# Patient Record
Sex: FEMALE | Race: WHITE | URBAN - METROPOLITAN AREA
[De-identification: names, ages, dates, MRNs, and addresses within clinical notes are randomized per-mention and may not be internally consistent; named-entity substitution may affect disease eponyms.]

---

## 2023-12-20 ENCOUNTER — HOSPITAL ENCOUNTER (EMERGENCY)
Facility: HOSPITAL | Age: 63
Discharge: HOME | End: 2023-12-20
Payer: COMMERCIAL

## 2023-12-20 ENCOUNTER — APPOINTMENT (OUTPATIENT)
Dept: RADIOLOGY | Facility: HOSPITAL | Age: 63
End: 2023-12-20
Payer: COMMERCIAL

## 2023-12-20 VITALS
WEIGHT: 270 LBS | RESPIRATION RATE: 18 BRPM | TEMPERATURE: 97.5 F | DIASTOLIC BLOOD PRESSURE: 65 MMHG | HEIGHT: 63 IN | SYSTOLIC BLOOD PRESSURE: 136 MMHG | HEART RATE: 66 BPM | BODY MASS INDEX: 47.84 KG/M2 | OXYGEN SATURATION: 98 %

## 2023-12-20 DIAGNOSIS — S92.325A CLOSED NONDISPLACED FRACTURE OF SECOND METATARSAL BONE OF LEFT FOOT, INITIAL ENCOUNTER: Primary | ICD-10-CM

## 2023-12-20 DIAGNOSIS — S93.325A DISLOCATION OF TARSOMETATARSAL JOINT OF LEFT FOOT, INITIAL ENCOUNTER: ICD-10-CM

## 2023-12-20 PROCEDURE — 2500000001 HC RX 250 WO HCPCS SELF ADMINISTERED DRUGS (ALT 637 FOR MEDICARE OP): Performed by: NURSE PRACTITIONER

## 2023-12-20 PROCEDURE — 73610 X-RAY EXAM OF ANKLE: CPT | Mod: LEFT SIDE | Performed by: RADIOLOGY

## 2023-12-20 PROCEDURE — 73630 X-RAY EXAM OF FOOT: CPT | Mod: LT

## 2023-12-20 PROCEDURE — 29515 APPLICATION SHORT LEG SPLINT: CPT | Mod: LT

## 2023-12-20 PROCEDURE — 73610 X-RAY EXAM OF ANKLE: CPT | Mod: LT

## 2023-12-20 PROCEDURE — 99284 EMERGENCY DEPT VISIT MOD MDM: CPT

## 2023-12-20 RX ORDER — OXYCODONE AND ACETAMINOPHEN 5; 325 MG/1; MG/1
1 TABLET ORAL 4 TIMES DAILY PRN
Qty: 10 TABLET | Refills: 0 | Status: SHIPPED | OUTPATIENT
Start: 2023-12-20

## 2023-12-20 RX ORDER — IBUPROFEN 600 MG/1
600 TABLET ORAL ONCE
Status: COMPLETED | OUTPATIENT
Start: 2023-12-20 | End: 2023-12-20

## 2023-12-20 RX ADMIN — IBUPROFEN 600 MG: 600 TABLET, FILM COATED ORAL at 18:52

## 2023-12-20 ASSESSMENT — COLUMBIA-SUICIDE SEVERITY RATING SCALE - C-SSRS
1. IN THE PAST MONTH, HAVE YOU WISHED YOU WERE DEAD OR WISHED YOU COULD GO TO SLEEP AND NOT WAKE UP?: NO
2. HAVE YOU ACTUALLY HAD ANY THOUGHTS OF KILLING YOURSELF?: NO
6. HAVE YOU EVER DONE ANYTHING, STARTED TO DO ANYTHING, OR PREPARED TO DO ANYTHING TO END YOUR LIFE?: NO

## 2023-12-20 ASSESSMENT — PAIN - FUNCTIONAL ASSESSMENT
PAIN_FUNCTIONAL_ASSESSMENT: 0-10
PAIN_FUNCTIONAL_ASSESSMENT: WONG-BAKER FACES

## 2023-12-20 ASSESSMENT — PAIN DESCRIPTION - LOCATION
LOCATION: FOOT
LOCATION: FOOT

## 2023-12-20 ASSESSMENT — PAIN SCALES - GENERAL: PAINLEVEL_OUTOF10: 5 - MODERATE PAIN

## 2023-12-20 ASSESSMENT — PAIN SCALES - WONG BAKER: WONGBAKER_NUMERICALRESPONSE: HURTS LITTLE BIT

## 2023-12-20 ASSESSMENT — PAIN DESCRIPTION - ORIENTATION: ORIENTATION: LEFT

## 2023-12-20 NOTE — ED PROVIDER NOTES
HPI   Chief Complaint   Patient presents with    Ankle Pain       Patient is a healthy nontoxic-appearing 63-year-old female with past medical history of otalgia, TMJ dysfunction, presents to the emergency room for complaint of left ankle and foot pain.  Patient states she accidentally slipped on the ice just prior to arrival.  Patient states she rolled her ankle.  Patient states she called EMS who brought her to the hospital.  Patient denies hitting her head or any loss of consciousness.  Patient denies any neck pain.  Patient denies any sore throat chest pain shortness of breath or difficulty breathing, abdominal pain, nausea, vomiting, diarrhea or constipation.  Patient denies any fever, shaking, chills.  Patient denies any blood thinner use, numbness or tingling, headache pain, visual disturbances, numbness and tingling.  Patient states he was unable to walk after injuring her leg.                          Holly Coma Scale Score: 15                  Patient History   History reviewed. No pertinent past medical history.  History reviewed. No pertinent surgical history.  No family history on file.  Social History     Tobacco Use    Smoking status: Not on file    Smokeless tobacco: Not on file   Substance Use Topics    Alcohol use: Not on file    Drug use: Not on file       Physical Exam   ED Triage Vitals [12/20/23 1800]   Temp Heart Rate Resp BP   36.4 °C (97.5 °F) 69 20 132/62      SpO2 Temp Source Heart Rate Source Patient Position   98 % Temporal Brachial Sitting      BP Location FiO2 (%)     Left arm --       Physical Exam  Vitals and nursing note reviewed.   Constitutional:       General: She is not in acute distress.     Appearance: Normal appearance. She is not ill-appearing, toxic-appearing or diaphoretic.   HENT:      Head: Normocephalic.   Eyes:      General:         Right eye: No discharge.         Left eye: No discharge.      Extraocular Movements: Extraocular movements intact.      Pupils: Pupils  are equal, round, and reactive to light.   Cardiovascular:      Rate and Rhythm: Normal rate and regular rhythm.      Pulses: Normal pulses.      Heart sounds: Normal heart sounds. No murmur heard.     No friction rub. No gallop.   Pulmonary:      Effort: Pulmonary effort is normal. No respiratory distress.      Breath sounds: Normal breath sounds. No stridor. No wheezing, rhonchi or rales.   Chest:      Chest wall: No tenderness.   Musculoskeletal:         General: Swelling, tenderness and signs of injury present. No deformity. Normal range of motion.      Cervical back: Normal range of motion and neck supple.      Right lower leg: No edema.      Left lower leg: No edema.      Comments: Left ankle is in a vacuum splint on initial evaluation, DP pulses are strong and intact, cap refill less than 2 seconds, able to flex extend all digits without any difficulty, cap refill less than 3 seconds   Skin:     General: Skin is warm.      Capillary Refill: Capillary refill takes less than 2 seconds.      Coloration: Skin is not jaundiced or pale.      Findings: No bruising, erythema, lesion or rash.   Neurological:      General: No focal deficit present.      Mental Status: She is alert and oriented to person, place, and time.         ED Course & MDM   ED Course as of 12/20/23 1937   Wed Dec 20, 2023   1937 X-ray of the foot reveals  IMPRESSION:  Nondisplaced fracture through the base of the 2nd metatarsal. No  subluxation seen however findings are compatible with underlying  Lisfranc injury. Cross-sectional imaging such as CT can be performed  for further evaluation   [EC]   1937 X-ray of the ankle reveals  IMPRESSION:  No acute abnormality in the ankle. There is lateral malleolar soft  tissue edema.   [EC]      ED Course User Index  [EC] Thai Pressley, APRN-CNP         Diagnoses as of 12/20/23 1937   Closed nondisplaced fracture of second metatarsal bone of left foot, initial encounter   Dislocation of tarsometatarsal  joint of left foot, initial encounter       Medical Decision Making  Given patient's complaint and presentation a thorough exam was performed.  Patient does have reproducible tenderness upon palpation to the anterior ankle in a vacuum splint as well as reproducible tenderness upon palpation to the dorsal surface of the left foot.  DP pulses are strong and regular, vacuum splint obstructing PT pulse evaluation initially, patient is able to flex extend all digits without any difficulty, cap refill less than 2 seconds, remains hemodynamically stable during emergency room evaluation.  X-ray ordered of the left foot and left ankle.  X-rays of the left foot and ankle interpreted by radiologist revealed nondisplaced fracture through the base of the second metatarsal, no subluxation seen however findings are compatible with underlying Lisfranc injury.  I consulted Dr. Mcgarry in regards to x-ray findings.  Dr. Mcgarry recommends posterior short leg splint, crutches, nonweightbearing status and follow-up in the office within the next 1 to 2 days.  Recommendations were then discussed with patient.  Patient was placed in a posterior short leg splint and tolerated procedure well.  Patient remained neurovascularly intact after splint application.  Patient received crutches in the emergency room as well as a prescription for Percocet.  Patient received ibuprofen in the emergency room for discomfort.  I encouraged monitoring symptoms and if they become worse was given strict precautions return the emergency room immediately, otherwise follow-up per Dr. Mcgarry recommendations.  The patient was agreeable this plan discharged home in stable condition.    JUAN ANTONIO Hernandez     Portions of this note were generated using digital voice recognition software, and may contain grammatical errors      Procedure  Procedures       JUAN ANTONIO Hernandez  12/20/23 1939

## 2023-12-21 NOTE — DISCHARGE INSTRUCTIONS
"What is a fracture?  A \"fracture\" is another word for a broken bone. There are different kinds of fractures, depending on how the bone breaks. When a bone breaks, it might crack, break all the way through, or shatter. If a broken bone sticks out of the skin or can be seen through a wound, doctors call it an \"open\" fracture. If the bone does not stick out of the skin or cannot be seen through a wound, doctors call it a \"closed\" fracture.    People with a condition called osteoporosis have a higher chance of getting a fracture. That's because osteoporosis makes a person's bones weak. This condition is especially common in older females.    What are the symptoms of a fracture?  Symptoms depend on which bone breaks and the kind of break it is. Common symptoms can include:    ?Pain, swelling, or bruising over the area    ?The area looking abnormal, bent, or not the usual shape    ?Not being able to move or put weight on that part of the body    ?Numbness in the area of the broken bone    If a fracture injures a nerve, this can also cause symptoms in nearby areas. For example, a break to the upper arm bone might cause pain, tingling, or weakness in the elbow and wrist.    Is there a test for a fracture?  Yes. Your doctor or nurse will ask about your symptoms, do an exam, and take an X-ray. They might do other imaging tests, such as a CT, bone scan, or ultrasound. Imaging tests create pictures of the inside of the body.    How are fractures treated?  Treatment depends, in part, on the type of fracture you have and how serious it is. The goal of treatment is to have the ends of the broken bone line up with each other so that the bone can heal.    If the ends of your broken bone are already in line with each other, your doctor will put a cast, splint, or brace on that part of the body. The cast, splint, or brace will keep your bone in the correct position so that it can heal.    If the ends of your broken bone are not in " line with each other, your doctor will need to line them up. To do this, they can move your bone to the correct position without doing surgery, and then put a cast, splint, or brace on.    They can also do surgery to put your bone back in the correct position. This can involve:    ?Using screws, pins, rods, or plates to fix a bone inside the body    ?Putting pins or screws through the skin and into the bone, and then attaching the pins or screws to a bar that is outside the skin    Your doctor will also treat your pain. If you have a severe fracture, they can prescribe a strong pain medicine. If you have a mild fracture, they might recommend that you take an over-the-counter medicine for your pain. Over-the-counter medicines include acetaminophen (sample brand name: Tylenol), ibuprofen (sample brand names: Advil, Motrin), and naproxen (sample brand name: Aleve).    After your bone heals, your doctor might recommend that you work with a physical therapist (exercise expert). The physical therapist can show you exercises and stretches to strengthen your muscles and help your joints move more easily.    How long do fractures take to heal?  It depends on the body part involved and the type of fracture. Most fractures take weeks to months to heal. Fractures in children usually heal faster than fractures in adults.    Can I do anything to improve the healing process?  Yes. It's important to follow all of your doctor's instructions while your fracture is healing. For example, they will probably recommend that you eat a healthy diet that includes getting enough calcium, protein, and vitamin D (figure 1 and table 1). They will also probably recommend that you:    ?Not play certain sports    ?Not smoke - If you smoke, it can take longer for your fracture to heal.    ?Not get your cast wet, if you have a cast that shouldn't get wet    When should I call my doctor or nurse?  After treatment, your doctor or nurse will tell you  when to call them. In general, you should call if:    ?Your pain, swelling, or other symptoms get worse    ?You get a fever    ?You can't move part of your body    ?You get your cast wet, and it's not supposed to get wet    What can I do to prevent getting another fracture?  To protect your body from getting hurt, you can:    ?Wear safety gear when you bike, ski, rollerblade, or do activities where you could get hurt. Safety gear can include helmets, elbow pads, knee pads, wrist guards, and shin pads.    ?Keep walkways clear of clutter, and remove or tack down loose rugs    ?Wear a seatbelt every time you ride or drive in a car    ?Put a non-slip mat in the bathtub and handrails on the stairs (especially for older people)    To help your bones stay strong so that they don't break easily, you can:    ?Eat and drink foods with a lot of calcium, vitamin D, and protein. Calcium and vitamin D are nutrients that help keep bones strong.    ?Get regular exercise, even if it's just taking a daily walk

## 2024-04-15 ENCOUNTER — OFFICE VISIT (OUTPATIENT)
Dept: ORTHOPEDIC SURGERY | Facility: CLINIC | Age: 64
End: 2024-04-15
Payer: COMMERCIAL

## 2024-04-15 VITALS — HEIGHT: 62 IN | BODY MASS INDEX: 49.13 KG/M2 | WEIGHT: 267 LBS

## 2024-04-15 DIAGNOSIS — M77.11 RIGHT LATERAL EPICONDYLITIS: Primary | ICD-10-CM

## 2024-04-15 PROCEDURE — 1036F TOBACCO NON-USER: CPT | Performed by: ORTHOPAEDIC SURGERY

## 2024-04-15 PROCEDURE — 99203 OFFICE O/P NEW LOW 30 MIN: CPT | Performed by: ORTHOPAEDIC SURGERY

## 2024-04-15 RX ORDER — VALBENAZINE 80 MG/1
CAPSULE ORAL
COMMUNITY
Start: 2023-12-14

## 2024-04-15 RX ORDER — LEVOTHYROXINE SODIUM 75 UG/1
75 TABLET ORAL DAILY
COMMUNITY
Start: 2024-03-21

## 2024-04-15 RX ORDER — IBUPROFEN 800 MG/1
800 TABLET ORAL 3 TIMES DAILY
Qty: 90 TABLET | Refills: 0 | Status: SHIPPED | OUTPATIENT
Start: 2024-04-15 | End: 2024-05-15

## 2024-04-15 RX ORDER — HYDROGEN PEROXIDE 3 %
1 SOLUTION, NON-ORAL MISCELLANEOUS
COMMUNITY
Start: 2023-12-15

## 2024-04-15 RX ORDER — LUMATEPERONE 42 MG/1
42 CAPSULE ORAL
COMMUNITY
Start: 2023-12-15

## 2024-04-15 RX ORDER — LISINOPRIL 10 MG/1
1 TABLET ORAL
COMMUNITY
Start: 2023-12-15

## 2024-04-15 RX ORDER — PRAVASTATIN SODIUM 20 MG/1
1 TABLET ORAL
COMMUNITY
Start: 2023-12-15

## 2024-04-15 NOTE — PROGRESS NOTES
Subjective    Patient ID: Yisel Squires is a 63 y.o. female.    Chief Complaint: Pain of the Right Elbow     Last Surgery: No surgery found  Last Surgery Date: No surgery found    HPI  Is a 63-year-old right-hand-dominant female who comes in with a complaint of right elbow pain.  This is been present since she fell on right upper extremity on the ice on December 20, 2023.  She has had multiple imaging studies done.  She states that her most recent imaging was an MRI.  She comes in after this was found to have showing injury to the extensor tendons.  Most of her pain is near the joint itself.  It is mostly lateral as opposed to medial.  She denies numbness and paresthesias.  Objective   Ortho Exam  Patient is in no acute distress.  Exam of the patient's right upper extremity reveals she has pain-free motion at her shoulder and wrist.  She has range of motion at her right elbow from 0 to greater than 130 degrees of flexion.  She has full supination and pronation.  She is tender over the lateral epicondyle.  She has no tenderness medially.  She has increased pain with resisted wrist extension.    Image Results:  The patient has an MRI which was reviewed.  It does show evidence of lateral epicondylitis as there is tears in the common extensor tendons.    Assessment/Plan   Encounter Diagnoses:  Right lateral epicondylitis    Orders Placed This Encounter    ibuprofen 800 mg tablet     Patient has evidence of atraumatic right lateral epicondylitis.  At this time the patient will undergo therapy.  The patient was also prescribed ibuprofen.  The patient will follow-up as her symptoms dictate.

## 2024-10-21 ENCOUNTER — OFFICE VISIT (OUTPATIENT)
Dept: ORTHOPEDIC SURGERY | Facility: CLINIC | Age: 64
End: 2024-10-21
Payer: COMMERCIAL

## 2024-10-21 DIAGNOSIS — M77.11 RIGHT LATERAL EPICONDYLITIS: ICD-10-CM

## 2024-10-21 DIAGNOSIS — M25.521 RIGHT ELBOW PAIN: Primary | ICD-10-CM

## 2024-10-21 PROCEDURE — 99213 OFFICE O/P EST LOW 20 MIN: CPT | Performed by: ORTHOPAEDIC SURGERY

## 2024-10-21 PROCEDURE — 99213 OFFICE O/P EST LOW 20 MIN: CPT | Mod: 25 | Performed by: ORTHOPAEDIC SURGERY

## 2024-10-21 PROCEDURE — 1036F TOBACCO NON-USER: CPT | Performed by: ORTHOPAEDIC SURGERY

## 2024-10-21 PROCEDURE — 20551 NJX 1 TENDON ORIGIN/INSJ: CPT | Mod: RT | Performed by: ORTHOPAEDIC SURGERY

## 2024-10-21 PROCEDURE — 2500000004 HC RX 250 GENERAL PHARMACY W/ HCPCS (ALT 636 FOR OP/ED): Performed by: ORTHOPAEDIC SURGERY

## 2024-10-21 RX ORDER — LIDOCAINE HYDROCHLORIDE 20 MG/ML
1 INJECTION, SOLUTION INFILTRATION; PERINEURAL
Status: COMPLETED | OUTPATIENT
Start: 2024-10-21 | End: 2024-10-21

## 2024-10-21 RX ORDER — IBUPROFEN 800 MG/1
800 TABLET ORAL EVERY 8 HOURS PRN
Qty: 90 TABLET | Refills: 3 | Status: SHIPPED | OUTPATIENT
Start: 2024-10-21 | End: 2024-11-20

## 2024-10-21 RX ORDER — TRIAMCINOLONE ACETONIDE 40 MG/ML
40 INJECTION, SUSPENSION INTRA-ARTICULAR; INTRAMUSCULAR
Status: COMPLETED | OUTPATIENT
Start: 2024-10-21 | End: 2024-10-21

## 2024-10-21 NOTE — PROGRESS NOTES
Subjective    Patient ID: Yisel Squires is a 64 y.o. female.    Chief Complaint: Follow-up of the Right Elbow     Last Surgery: No surgery found  Last Surgery Date: No surgery found    HPI  The patient returns today for follow-up of her right lateral epicondylitis.  Since her last visit she noticed mild improvement with ibuprofen and stretching.  Today she would like to discuss further treatment possibilities.    Objective   Ortho Exam  The patient is in no acute distress.  Exam of her right upper extremity reveals that she has pain-free motion at her shoulder and elbow.  She is tender over the right lateral epicondyle.  She has no tenderness medially.  She does have increased pain with resisted wrist and finger extension.    Assessment/Plan   Encounter Diagnoses:  Right elbow pain    Right lateral epicondylitis    Orders Placed This Encounter    ibuprofen 800 mg tablet     The patient has continued pain from her right lower epicondylitis.  She will continue with her therapy program.  However she would like to undergo a Kenalog injection today.    Hand / UE Inj/Asp: R elbow for lateral epicondylitis on 10/21/2024 2:07 PM  Indications: pain  Details: 25 G needle, lateral approach  Medications: 40 mg triamcinolone acetonide 40 mg/mL; 1 mL lidocaine 20 mg/mL (2 %)  Outcome: tolerated well, no immediate complications  Procedure, treatment alternatives, risks and benefits explained, specific risks discussed. Consent was given by the patient. Immediately prior to procedure a time out was called to verify the correct patient, procedure, equipment, support staff and site/side marked as required. Patient was prepped and draped in the usual sterile fashion.         The patient was informed it would take approximately a week for the injection of an effect.  She will continue with her program of exercises.  She will follow-up as her symptoms dictate.

## 2025-04-26 ENCOUNTER — APPOINTMENT (OUTPATIENT)
Dept: RADIOLOGY | Facility: HOSPITAL | Age: 65
End: 2025-04-26
Payer: COMMERCIAL

## 2025-04-26 ENCOUNTER — APPOINTMENT (OUTPATIENT)
Dept: CARDIOLOGY | Facility: HOSPITAL | Age: 65
End: 2025-04-26
Payer: COMMERCIAL

## 2025-04-26 ENCOUNTER — HOSPITAL ENCOUNTER (INPATIENT)
Facility: HOSPITAL | Age: 65
End: 2025-04-26
Attending: STUDENT IN AN ORGANIZED HEALTH CARE EDUCATION/TRAINING PROGRAM | Admitting: STUDENT IN AN ORGANIZED HEALTH CARE EDUCATION/TRAINING PROGRAM
Payer: COMMERCIAL

## 2025-04-26 DIAGNOSIS — K85.90 ACUTE PANCREATITIS, UNSPECIFIED COMPLICATION STATUS, UNSPECIFIED PANCREATITIS TYPE (HHS-HCC): Primary | ICD-10-CM

## 2025-04-26 LAB
ALBUMIN SERPL BCP-MCNC: 4.3 G/DL (ref 3.4–5)
ALP SERPL-CCNC: 84 U/L (ref 33–136)
ALT SERPL W P-5'-P-CCNC: 14 U/L (ref 7–45)
ANION GAP SERPL CALC-SCNC: 15 MMOL/L (ref 10–20)
APPEARANCE UR: CLEAR
AST SERPL W P-5'-P-CCNC: 16 U/L (ref 9–39)
BASOPHILS # BLD AUTO: 0.04 X10*3/UL (ref 0–0.1)
BASOPHILS NFR BLD AUTO: 0.3 %
BILIRUB SERPL-MCNC: 0.7 MG/DL (ref 0–1.2)
BILIRUB UR STRIP.AUTO-MCNC: NEGATIVE MG/DL
BUN SERPL-MCNC: 10 MG/DL (ref 6–23)
CALCIUM SERPL-MCNC: 9.1 MG/DL (ref 8.6–10.3)
CHLORIDE SERPL-SCNC: 102 MMOL/L (ref 98–107)
CHOLEST SERPL-MCNC: 160 MG/DL (ref 0–199)
CHOLESTEROL/HDL RATIO: 2
CO2 SERPL-SCNC: 24 MMOL/L (ref 21–32)
COLOR UR: COLORLESS
CREAT SERPL-MCNC: 0.85 MG/DL (ref 0.5–1.05)
EGFRCR SERPLBLD CKD-EPI 2021: 78 ML/MIN/1.73M*2
EOSINOPHIL # BLD AUTO: 0.03 X10*3/UL (ref 0–0.7)
EOSINOPHIL NFR BLD AUTO: 0.2 %
ERYTHROCYTE [DISTWIDTH] IN BLOOD BY AUTOMATED COUNT: 14.3 % (ref 11.5–14.5)
EST. AVERAGE GLUCOSE BLD GHB EST-MCNC: 120 MG/DL
GLUCOSE SERPL-MCNC: 111 MG/DL (ref 74–99)
GLUCOSE UR STRIP.AUTO-MCNC: NORMAL MG/DL
HBA1C MFR BLD: 5.8 % (ref ?–5.7)
HCT VFR BLD AUTO: 40.9 % (ref 36–46)
HDLC SERPL-MCNC: 80.3 MG/DL
HGB BLD-MCNC: 13.2 G/DL (ref 12–16)
HOLD SPECIMEN: NORMAL
IMM GRANULOCYTES # BLD AUTO: 0.04 X10*3/UL (ref 0–0.7)
IMM GRANULOCYTES NFR BLD AUTO: 0.3 % (ref 0–0.9)
KETONES UR STRIP.AUTO-MCNC: NEGATIVE MG/DL
LDLC SERPL CALC-MCNC: 52 MG/DL
LEUKOCYTE ESTERASE UR QL STRIP.AUTO: NEGATIVE
LIPASE SERPL-CCNC: 1822 U/L (ref 9–82)
LYMPHOCYTES # BLD AUTO: 1.32 X10*3/UL (ref 1.2–4.8)
LYMPHOCYTES NFR BLD AUTO: 10.6 %
MAGNESIUM SERPL-MCNC: 1.84 MG/DL (ref 1.6–2.4)
MCH RBC QN AUTO: 30.6 PG (ref 26–34)
MCHC RBC AUTO-ENTMCNC: 32.3 G/DL (ref 32–36)
MCV RBC AUTO: 95 FL (ref 80–100)
MONOCYTES # BLD AUTO: 0.74 X10*3/UL (ref 0.1–1)
MONOCYTES NFR BLD AUTO: 5.9 %
NEUTROPHILS # BLD AUTO: 10.32 X10*3/UL (ref 1.2–7.7)
NEUTROPHILS NFR BLD AUTO: 82.7 %
NITRITE UR QL STRIP.AUTO: NEGATIVE
NON HDL CHOLESTEROL: 80 MG/DL (ref 0–149)
NRBC BLD-RTO: 0 /100 WBCS (ref 0–0)
PH UR STRIP.AUTO: 6 [PH]
PLATELET # BLD AUTO: 322 X10*3/UL (ref 150–450)
POTASSIUM SERPL-SCNC: 4.1 MMOL/L (ref 3.5–5.3)
PROT SERPL-MCNC: 6.6 G/DL (ref 6.4–8.2)
PROT UR STRIP.AUTO-MCNC: NEGATIVE MG/DL
RBC # BLD AUTO: 4.32 X10*6/UL (ref 4–5.2)
RBC # UR STRIP.AUTO: NEGATIVE MG/DL
SODIUM SERPL-SCNC: 137 MMOL/L (ref 136–145)
SP GR UR STRIP.AUTO: 1.02
TRIGL SERPL-MCNC: 140 MG/DL (ref 0–149)
UROBILINOGEN UR STRIP.AUTO-MCNC: NORMAL MG/DL
VLDL: 28 MG/DL (ref 0–40)
WBC # BLD AUTO: 12.5 X10*3/UL (ref 4.4–11.3)

## 2025-04-26 PROCEDURE — 2500000004 HC RX 250 GENERAL PHARMACY W/ HCPCS (ALT 636 FOR OP/ED): Mod: JZ

## 2025-04-26 PROCEDURE — 99285 EMERGENCY DEPT VISIT HI MDM: CPT | Mod: 25 | Performed by: STUDENT IN AN ORGANIZED HEALTH CARE EDUCATION/TRAINING PROGRAM

## 2025-04-26 PROCEDURE — 80053 COMPREHEN METABOLIC PANEL: CPT | Performed by: STUDENT IN AN ORGANIZED HEALTH CARE EDUCATION/TRAINING PROGRAM

## 2025-04-26 PROCEDURE — 74183 MRI ABD W/O CNTR FLWD CNTR: CPT

## 2025-04-26 PROCEDURE — 2550000001 HC RX 255 CONTRASTS: Mod: JZ

## 2025-04-26 PROCEDURE — 96374 THER/PROPH/DIAG INJ IV PUSH: CPT

## 2025-04-26 PROCEDURE — 93005 ELECTROCARDIOGRAM TRACING: CPT

## 2025-04-26 PROCEDURE — 36415 COLL VENOUS BLD VENIPUNCTURE: CPT | Performed by: STUDENT IN AN ORGANIZED HEALTH CARE EDUCATION/TRAINING PROGRAM

## 2025-04-26 PROCEDURE — 2500000004 HC RX 250 GENERAL PHARMACY W/ HCPCS (ALT 636 FOR OP/ED): Mod: JZ | Performed by: STUDENT IN AN ORGANIZED HEALTH CARE EDUCATION/TRAINING PROGRAM

## 2025-04-26 PROCEDURE — 96361 HYDRATE IV INFUSION ADD-ON: CPT

## 2025-04-26 PROCEDURE — 85025 COMPLETE CBC W/AUTO DIFF WBC: CPT | Performed by: STUDENT IN AN ORGANIZED HEALTH CARE EDUCATION/TRAINING PROGRAM

## 2025-04-26 PROCEDURE — 74177 CT ABD & PELVIS W/CONTRAST: CPT | Performed by: RADIOLOGY

## 2025-04-26 PROCEDURE — 2550000001 HC RX 255 CONTRASTS: Performed by: STUDENT IN AN ORGANIZED HEALTH CARE EDUCATION/TRAINING PROGRAM

## 2025-04-26 PROCEDURE — 76376 3D RENDER W/INTRP POSTPROCES: CPT | Performed by: RADIOLOGY

## 2025-04-26 PROCEDURE — 83735 ASSAY OF MAGNESIUM: CPT | Performed by: STUDENT IN AN ORGANIZED HEALTH CARE EDUCATION/TRAINING PROGRAM

## 2025-04-26 PROCEDURE — A9575 INJ GADOTERATE MEGLUMI 0.1ML: HCPCS | Mod: JZ

## 2025-04-26 PROCEDURE — 74177 CT ABD & PELVIS W/CONTRAST: CPT

## 2025-04-26 PROCEDURE — 96375 TX/PRO/DX INJ NEW DRUG ADDON: CPT

## 2025-04-26 PROCEDURE — 81003 URINALYSIS AUTO W/O SCOPE: CPT | Performed by: STUDENT IN AN ORGANIZED HEALTH CARE EDUCATION/TRAINING PROGRAM

## 2025-04-26 PROCEDURE — 74183 MRI ABD W/O CNTR FLWD CNTR: CPT | Performed by: RADIOLOGY

## 2025-04-26 PROCEDURE — 83718 ASSAY OF LIPOPROTEIN: CPT

## 2025-04-26 PROCEDURE — 83690 ASSAY OF LIPASE: CPT | Performed by: STUDENT IN AN ORGANIZED HEALTH CARE EDUCATION/TRAINING PROGRAM

## 2025-04-26 PROCEDURE — 2500000001 HC RX 250 WO HCPCS SELF ADMINISTERED DRUGS (ALT 637 FOR MEDICARE OP)

## 2025-04-26 PROCEDURE — 2500000002 HC RX 250 W HCPCS SELF ADMINISTERED DRUGS (ALT 637 FOR MEDICARE OP, ALT 636 FOR OP/ED)

## 2025-04-26 PROCEDURE — 1200000002 HC GENERAL ROOM WITH TELEMETRY DAILY

## 2025-04-26 PROCEDURE — 83036 HEMOGLOBIN GLYCOSYLATED A1C: CPT | Mod: PARLAB

## 2025-04-26 RX ORDER — GADOTERATE MEGLUMINE 376.9 MG/ML
20 INJECTION INTRAVENOUS
Status: COMPLETED | OUTPATIENT
Start: 2025-04-26 | End: 2025-04-26

## 2025-04-26 RX ORDER — SODIUM CHLORIDE, SODIUM LACTATE, POTASSIUM CHLORIDE, CALCIUM CHLORIDE 600; 310; 30; 20 MG/100ML; MG/100ML; MG/100ML; MG/100ML
250 INJECTION, SOLUTION INTRAVENOUS CONTINUOUS
Status: DISCONTINUED | OUTPATIENT
Start: 2025-04-26 | End: 2025-04-27

## 2025-04-26 RX ORDER — POLYETHYLENE GLYCOL 3350 17 G/17G
17 POWDER, FOR SOLUTION ORAL DAILY
Status: DISCONTINUED | OUTPATIENT
Start: 2025-04-26 | End: 2025-04-28 | Stop reason: HOSPADM

## 2025-04-26 RX ORDER — LISINOPRIL 10 MG/1
10 TABLET ORAL DAILY
Status: DISCONTINUED | OUTPATIENT
Start: 2025-04-26 | End: 2025-04-28 | Stop reason: HOSPADM

## 2025-04-26 RX ORDER — ENOXAPARIN SODIUM 100 MG/ML
40 INJECTION SUBCUTANEOUS EVERY 12 HOURS SCHEDULED
Status: DISCONTINUED | OUTPATIENT
Start: 2025-04-26 | End: 2025-04-28 | Stop reason: HOSPADM

## 2025-04-26 RX ORDER — ONDANSETRON 4 MG/1
4 TABLET, ORALLY DISINTEGRATING ORAL EVERY 8 HOURS PRN
Status: DISCONTINUED | OUTPATIENT
Start: 2025-04-26 | End: 2025-04-28 | Stop reason: HOSPADM

## 2025-04-26 RX ORDER — PSYLLIUM HUSK 0.4 G
1 CAPSULE ORAL DAILY
COMMUNITY

## 2025-04-26 RX ORDER — ACETAMINOPHEN 325 MG/1
650 TABLET ORAL EVERY 4 HOURS PRN
Status: DISCONTINUED | OUTPATIENT
Start: 2025-04-26 | End: 2025-04-28 | Stop reason: HOSPADM

## 2025-04-26 RX ORDER — PRAVASTATIN SODIUM 20 MG/1
20 TABLET ORAL
Status: DISCONTINUED | OUTPATIENT
Start: 2025-04-26 | End: 2025-04-28 | Stop reason: HOSPADM

## 2025-04-26 RX ORDER — LEVOTHYROXINE SODIUM 75 UG/1
75 TABLET ORAL DAILY
Status: DISCONTINUED | OUTPATIENT
Start: 2025-04-27 | End: 2025-04-28 | Stop reason: HOSPADM

## 2025-04-26 RX ORDER — XANOMELINE AND TROSPIUM CHLORIDE 20; 50 MG/1; MG/1
1 CAPSULE, COATED PELLETS ORAL 2 TIMES DAILY
COMMUNITY

## 2025-04-26 RX ORDER — ALLOPURINOL 100 MG/1
100 TABLET ORAL 2 TIMES DAILY
Status: DISCONTINUED | OUTPATIENT
Start: 2025-04-26 | End: 2025-04-28 | Stop reason: HOSPADM

## 2025-04-26 RX ORDER — PANTOPRAZOLE SODIUM 20 MG/1
20 TABLET, DELAYED RELEASE ORAL
Status: DISCONTINUED | OUTPATIENT
Start: 2025-04-27 | End: 2025-04-28 | Stop reason: HOSPADM

## 2025-04-26 RX ORDER — ONDANSETRON HYDROCHLORIDE 2 MG/ML
4 INJECTION, SOLUTION INTRAVENOUS ONCE
Status: COMPLETED | OUTPATIENT
Start: 2025-04-26 | End: 2025-04-26

## 2025-04-26 RX ORDER — MORPHINE SULFATE 4 MG/ML
4 INJECTION, SOLUTION INTRAMUSCULAR; INTRAVENOUS ONCE
Status: DISCONTINUED | OUTPATIENT
Start: 2025-04-26 | End: 2025-04-28 | Stop reason: HOSPADM

## 2025-04-26 RX ORDER — FAMOTIDINE 10 MG/ML
20 INJECTION, SOLUTION INTRAVENOUS ONCE
Status: COMPLETED | OUTPATIENT
Start: 2025-04-26 | End: 2025-04-26

## 2025-04-26 RX ORDER — BISMUTH SUBSALICYLATE 262 MG
1 TABLET,CHEWABLE ORAL DAILY
COMMUNITY

## 2025-04-26 RX ORDER — LISINOPRIL 10 MG/1
10 TABLET ORAL DAILY
COMMUNITY

## 2025-04-26 RX ORDER — ALLOPURINOL 100 MG/1
100 TABLET ORAL 2 TIMES DAILY
COMMUNITY

## 2025-04-26 RX ORDER — ONDANSETRON HYDROCHLORIDE 2 MG/ML
4 INJECTION, SOLUTION INTRAVENOUS EVERY 8 HOURS PRN
Status: DISCONTINUED | OUTPATIENT
Start: 2025-04-26 | End: 2025-04-28 | Stop reason: HOSPADM

## 2025-04-26 RX ORDER — ACETAMINOPHEN 160 MG/5ML
650 SOLUTION ORAL EVERY 4 HOURS PRN
Status: DISCONTINUED | OUTPATIENT
Start: 2025-04-26 | End: 2025-04-28 | Stop reason: HOSPADM

## 2025-04-26 RX ORDER — ACETAMINOPHEN 650 MG/1
650 SUPPOSITORY RECTAL EVERY 4 HOURS PRN
Status: DISCONTINUED | OUTPATIENT
Start: 2025-04-26 | End: 2025-04-28 | Stop reason: HOSPADM

## 2025-04-26 RX ADMIN — ONDANSETRON 4 MG: 2 INJECTION INTRAMUSCULAR; INTRAVENOUS at 02:59

## 2025-04-26 RX ADMIN — ENOXAPARIN SODIUM 40 MG: 40 INJECTION SUBCUTANEOUS at 10:26

## 2025-04-26 RX ADMIN — FAMOTIDINE 20 MG: 10 INJECTION, SOLUTION INTRAVENOUS at 02:59

## 2025-04-26 RX ADMIN — ENOXAPARIN SODIUM 40 MG: 40 INJECTION SUBCUTANEOUS at 20:38

## 2025-04-26 RX ADMIN — GADOTERATE MEGLUMINE 20 ML: 376.9 INJECTION INTRAVENOUS at 14:05

## 2025-04-26 RX ADMIN — ALLOPURINOL 100 MG: 100 TABLET ORAL at 20:38

## 2025-04-26 RX ADMIN — ALLOPURINOL 100 MG: 100 TABLET ORAL at 15:06

## 2025-04-26 RX ADMIN — PRAVASTATIN SODIUM 20 MG: 20 TABLET ORAL at 15:06

## 2025-04-26 RX ADMIN — SODIUM CHLORIDE, SODIUM LACTATE, POTASSIUM CHLORIDE, AND CALCIUM CHLORIDE 250 ML/HR: .6; .31; .03; .02 INJECTION, SOLUTION INTRAVENOUS at 19:22

## 2025-04-26 RX ADMIN — IOHEXOL 75 ML: 350 INJECTION, SOLUTION INTRAVENOUS at 03:36

## 2025-04-26 RX ADMIN — SODIUM CHLORIDE, SODIUM LACTATE, POTASSIUM CHLORIDE, AND CALCIUM CHLORIDE 250 ML/HR: .6; .31; .03; .02 INJECTION, SOLUTION INTRAVENOUS at 10:14

## 2025-04-26 RX ADMIN — ONDANSETRON 4 MG: 2 INJECTION INTRAMUSCULAR; INTRAVENOUS at 22:52

## 2025-04-26 RX ADMIN — LUMATEPERONE 42 MG: 42 CAPSULE ORAL at 20:39

## 2025-04-26 RX ADMIN — SODIUM CHLORIDE, SODIUM LACTATE, POTASSIUM CHLORIDE, AND CALCIUM CHLORIDE 250 ML/HR: .6; .31; .03; .02 INJECTION, SOLUTION INTRAVENOUS at 15:12

## 2025-04-26 RX ADMIN — SODIUM CHLORIDE 1000 ML: 0.9 INJECTION, SOLUTION INTRAVENOUS at 02:59

## 2025-04-26 RX ADMIN — VALBENAZINE 80 MG: 80 CAPSULE ORAL at 20:39

## 2025-04-26 SDOH — SOCIAL STABILITY: SOCIAL INSECURITY: DO YOU FEEL UNSAFE GOING BACK TO THE PLACE WHERE YOU ARE LIVING?: NO

## 2025-04-26 SDOH — SOCIAL STABILITY: SOCIAL INSECURITY: HAVE YOU HAD ANY THOUGHTS OF HARMING ANYONE ELSE?: NO

## 2025-04-26 SDOH — SOCIAL STABILITY: SOCIAL INSECURITY: HAVE YOU HAD THOUGHTS OF HARMING ANYONE ELSE?: NO

## 2025-04-26 SDOH — SOCIAL STABILITY: SOCIAL INSECURITY: ABUSE: ADULT

## 2025-04-26 SDOH — SOCIAL STABILITY: SOCIAL INSECURITY: DO YOU FEEL ANYONE HAS EXPLOITED OR TAKEN ADVANTAGE OF YOU FINANCIALLY OR OF YOUR PERSONAL PROPERTY?: NO

## 2025-04-26 SDOH — SOCIAL STABILITY: SOCIAL INSECURITY: HAS ANYONE EVER THREATENED TO HURT YOUR FAMILY OR YOUR PETS?: NO

## 2025-04-26 SDOH — SOCIAL STABILITY: SOCIAL INSECURITY: ARE THERE ANY APPARENT SIGNS OF INJURIES/BEHAVIORS THAT COULD BE RELATED TO ABUSE/NEGLECT?: NO

## 2025-04-26 SDOH — SOCIAL STABILITY: SOCIAL INSECURITY: ARE YOU OR HAVE YOU BEEN THREATENED OR ABUSED PHYSICALLY, EMOTIONALLY, OR SEXUALLY BY ANYONE?: NO

## 2025-04-26 SDOH — SOCIAL STABILITY: SOCIAL INSECURITY: DOES ANYONE TRY TO KEEP YOU FROM HAVING/CONTACTING OTHER FRIENDS OR DOING THINGS OUTSIDE YOUR HOME?: NO

## 2025-04-26 SDOH — SOCIAL STABILITY: SOCIAL INSECURITY: WERE YOU ABLE TO COMPLETE ALL THE BEHAVIORAL HEALTH SCREENINGS?: YES

## 2025-04-26 ASSESSMENT — COGNITIVE AND FUNCTIONAL STATUS - GENERAL
DAILY ACTIVITIY SCORE: 24
MOBILITY SCORE: 24
PATIENT BASELINE BEDBOUND: NO
DAILY ACTIVITIY SCORE: 24
MOBILITY SCORE: 24

## 2025-04-26 ASSESSMENT — PAIN SCALES - GENERAL: PAINLEVEL_OUTOF10: 7

## 2025-04-26 ASSESSMENT — PAIN DESCRIPTION - ORIENTATION: ORIENTATION: MID

## 2025-04-26 ASSESSMENT — ACTIVITIES OF DAILY LIVING (ADL)
DRESSING YOURSELF: INDEPENDENT
WALKS IN HOME: INDEPENDENT
ADEQUATE_TO_COMPLETE_ADL: YES
HEARING - RIGHT EAR: FUNCTIONAL
FEEDING YOURSELF: INDEPENDENT
GROOMING: INDEPENDENT
HEARING - LEFT EAR: FUNCTIONAL
BATHING: INDEPENDENT
DRESSING YOURSELF: INDEPENDENT
LACK_OF_TRANSPORTATION: NO
TOILETING: INDEPENDENT
HEARING - RIGHT EAR: FUNCTIONAL
HEARING - LEFT EAR: FUNCTIONAL
TOILETING: INDEPENDENT
JUDGMENT_ADEQUATE_SAFELY_COMPLETE_DAILY_ACTIVITIES: YES
FEEDING YOURSELF: INDEPENDENT
WALKS IN HOME: INDEPENDENT
JUDGMENT_ADEQUATE_SAFELY_COMPLETE_DAILY_ACTIVITIES: YES
BATHING: INDEPENDENT
ADEQUATE_TO_COMPLETE_ADL: YES
LACK_OF_TRANSPORTATION: NO
GROOMING: INDEPENDENT

## 2025-04-26 ASSESSMENT — PATIENT HEALTH QUESTIONNAIRE - PHQ9
SUM OF ALL RESPONSES TO PHQ9 QUESTIONS 1 & 2: 0
2. FEELING DOWN, DEPRESSED OR HOPELESS: NOT AT ALL
1. LITTLE INTEREST OR PLEASURE IN DOING THINGS: NOT AT ALL

## 2025-04-26 ASSESSMENT — LIFESTYLE VARIABLES
AUDIT-C TOTAL SCORE: 2
HOW MANY STANDARD DRINKS CONTAINING ALCOHOL DO YOU HAVE ON A TYPICAL DAY: 1 OR 2
AUDIT-C TOTAL SCORE: 2
HOW OFTEN DO YOU HAVE 6 OR MORE DRINKS ON ONE OCCASION: NEVER
SKIP TO QUESTIONS 9-10: 1
HOW OFTEN DO YOU HAVE A DRINK CONTAINING ALCOHOL: 2-4 TIMES A MONTH

## 2025-04-26 ASSESSMENT — COLUMBIA-SUICIDE SEVERITY RATING SCALE - C-SSRS
6. HAVE YOU EVER DONE ANYTHING, STARTED TO DO ANYTHING, OR PREPARED TO DO ANYTHING TO END YOUR LIFE?: NO
1. IN THE PAST MONTH, HAVE YOU WISHED YOU WERE DEAD OR WISHED YOU COULD GO TO SLEEP AND NOT WAKE UP?: NO
2. HAVE YOU ACTUALLY HAD ANY THOUGHTS OF KILLING YOURSELF?: NO

## 2025-04-26 ASSESSMENT — PAIN DESCRIPTION - DESCRIPTORS: DESCRIPTORS: PRESSURE

## 2025-04-26 ASSESSMENT — PAIN DESCRIPTION - PAIN TYPE: TYPE: ACUTE PAIN

## 2025-04-26 ASSESSMENT — PAIN - FUNCTIONAL ASSESSMENT: PAIN_FUNCTIONAL_ASSESSMENT: 0-10

## 2025-04-26 ASSESSMENT — PAIN DESCRIPTION - LOCATION: LOCATION: ABDOMEN

## 2025-04-26 NOTE — ED TRIAGE NOTES
Patient comes from home for reported x2 days of epigastric fullness/pain. She states it feels like pressure. She states bowels have been normal, having a little nausea but no emesis

## 2025-04-26 NOTE — ED PROVIDER NOTES
HPI   Chief Complaint   Patient presents with    Abdominal Pain     Patient comes from home for reported x2 days of epigastric fullness/pain. She states it feels like pressure. She states bowels have been normal, having a little nausea but no emesis       64-year-old comes into the emergency department complaining of upper abdominal pain for the last 2 days that has been constant and without radiation.  Pain is worse with eating.  Patient denies any prior episodes besides this 2 days.  Patient is also feeling nauseous and she threw up once.  Patient denies any fever, cough, congestion, chest pain, shortness of breath, dysuria, hematuria, rectal bleeding, diarrhea or any other complaints.               Patient History   Medical History[1]  Surgical History[2]  Family History[3]  Social History[4]    Physical Exam   ED Triage Vitals [04/26/25 0147]   Temperature Heart Rate Respirations BP   36.5 °C (97.7 °F) 90 18 173/87      Pulse Ox Temp Source Heart Rate Source Patient Position   96 % Temporal Monitor Sitting      BP Location FiO2 (%)     Right arm --       Physical Exam  Vitals and nursing note reviewed.   Constitutional:       General: She is not in acute distress.     Appearance: Normal appearance. She is not toxic-appearing.   HENT:      Head: Atraumatic.      Nose: Nose normal.      Mouth/Throat:      Mouth: Mucous membranes are moist.   Eyes:      Extraocular Movements: Extraocular movements intact.      Conjunctiva/sclera: Conjunctivae normal.   Cardiovascular:      Rate and Rhythm: Normal rate and regular rhythm.      Pulses: Normal pulses.   Pulmonary:      Effort: Pulmonary effort is normal.      Breath sounds: Normal breath sounds.   Abdominal:      General: Bowel sounds are normal.      Palpations: Abdomen is soft.      Tenderness: There is abdominal tenderness in the right upper quadrant, epigastric area and left upper quadrant. There is no right CVA tenderness, left CVA tenderness, guarding or  rebound. Negative signs include Martinez's sign.   Musculoskeletal:         General: No swelling or deformity. Normal range of motion.      Cervical back: Normal range of motion and neck supple. No rigidity.   Skin:     General: Skin is warm.      Capillary Refill: Capillary refill takes less than 2 seconds.   Neurological:      General: No focal deficit present.      Mental Status: She is alert. Mental status is at baseline.   Psychiatric:         Mood and Affect: Mood normal.         Labs Reviewed   CBC WITH AUTO DIFFERENTIAL - Abnormal       Result Value    WBC 12.5 (*)     nRBC 0.0      RBC 4.32      Hemoglobin 13.2      Hematocrit 40.9      MCV 95      MCH 30.6      MCHC 32.3      RDW 14.3      Platelets 322      Neutrophils % 82.7      Immature Granulocytes %, Automated 0.3      Lymphocytes % 10.6      Monocytes % 5.9      Eosinophils % 0.2      Basophils % 0.3      Neutrophils Absolute 10.32 (*)     Immature Granulocytes Absolute, Automated 0.04      Lymphocytes Absolute 1.32      Monocytes Absolute 0.74      Eosinophils Absolute 0.03      Basophils Absolute 0.04     COMPREHENSIVE METABOLIC PANEL - Abnormal    Glucose 111 (*)     Sodium 137      Potassium 4.1      Chloride 102      Bicarbonate 24      Anion Gap 15      Urea Nitrogen 10      Creatinine 0.85      eGFR 78      Calcium 9.1      Albumin 4.3      Alkaline Phosphatase 84      Total Protein 6.6      AST 16      Bilirubin, Total 0.7      ALT 14     URINALYSIS WITH REFLEX CULTURE AND MICROSCOPIC - Abnormal    Color, Urine Colorless (*)     Appearance, Urine Clear      Specific Gravity, Urine 1.016      pH, Urine 6.0      Protein, Urine NEGATIVE      Glucose, Urine Normal      Blood, Urine NEGATIVE      Ketones, Urine NEGATIVE      Bilirubin, Urine NEGATIVE      Urobilinogen, Urine Normal      Nitrite, Urine NEGATIVE      Leukocyte Esterase, Urine NEGATIVE     LIPASE - Abnormal    Lipase 1,822 (*)     Narrative:     Venipuncture immediately after or  during the administration of Metamizole may lead to falsely low results. Testing should be performed immediately prior to Metamizole dosing.   MAGNESIUM - Normal    Magnesium 1.84     URINALYSIS WITH REFLEX CULTURE AND MICROSCOPIC    Narrative:     The following orders were created for panel order Urinalysis with Reflex Culture and Microscopic.  Procedure                               Abnormality         Status                     ---------                               -----------         ------                     Urinalysis with Reflex C...[[id number]]  Abnormal            Final result               Extra Urine Gray Tube[[id number]]                            In process                   Please view results for these tests on the individual orders.   EXTRA URINE GRAY TUBE        CT abdomen pelvis w IV contrast   Final Result   Inflammatory stranding in the upper and mid abdomen surrounding   portions of the pancreas as well as multiple loops of small bowel.   Differential includes enteritis or pancreatitis. Correlate with   clinical symptoms and lipase values        Gastric wall thickening versus underdistention. Correlate for   symptoms of gastritis.        Scattered colonic diverticulosis without CT evidence of acute   diverticulitis.             MACRO:   None.        Signed by: Evan Finkelstein 4/26/2025 4:22 AM   Dictation workstation:   [id number]           Medications   morphine injection 4 mg (4 mg intravenous Not Given 4/26/25 0304)   ondansetron (Zofran) injection 4 mg (4 mg intravenous Given 4/26/25 0259)   sodium chloride 0.9 % bolus 1,000 mL (0 mL intravenous Stopped 4/26/25 0359)   famotidine PF (Pepcid) injection 20 mg (20 mg intravenous Given 4/26/25 0259)   iohexol (OMNIPaque) 350 mg iodine/mL solution 75 mL (75 mL intravenous Given 4/26/25 0336)      ED Course & MDM   ED Course as of 04/26/25 0604   Sat Apr 26, 2025   0521 EKG completed.  Ventricular rate of 81.  Sinus rhythm.  Normal SD  interval.  No QRS widening.  No STEMI [AM]   0541 Lipase is pending.  Patient is overall feeling better but still with some abdominal pain.  She agreed with waiting for results. [AM]   0553 Results were discussed with patient.  She agreed with admission to the hospital due to pancreatitis. [AM]   0603 Dr Mccallum called back.  Case was discussed with him.  He accepted the patient to be admitted under his service [AM]      ED Course User Index  [AM] Juli Dash MD         Diagnoses as of 04/26/25 0604   Acute pancreatitis, unspecified complication status, unspecified pancreatitis type ([facility])                 No data recorded     Portal Coma Scale Score: 15 (04/26/25 0153 : Jennifer Varma RN)                           Medical Decision Making      Procedure  Procedures       [1] No past medical history on file.  [2] No past surgical history on file.  [3] No family history on file.  [4]   Social History  Tobacco Use    Smoking status: Never    Smokeless tobacco: Never   Substance Use Topics    Alcohol use: Yes     Comment: MODERATELY    Drug use: Never        Juli Dash MD  04/26/25 0604

## 2025-04-26 NOTE — PROGRESS NOTES
Verification of Patient's Own Meds  Does the patient name on the medication container/label match the inpatient patient name? Yes  Is the medication in date (1 year from dispensing date)? Yes  Is the medication in the original dispensing container? Yes    Identification of Medication  Was the medication successfully identified? Yes  Was the Authorization for Treatment with Patient’s Own Medication” form (Attachment A in MM-16) completed? No

## 2025-04-26 NOTE — H&P
Subjective/History     Yisel Squires is a 64 y.o. female with a history of Schizophrenia, Bipolar Mood Disorder, hypothyroidism, HTN, TMJ dysfunction presents with abdominal pain. Patient says abdominal pain started 2-3 days ago. Pain is described as a constant non-radiating epigastric pressure. Pain does not improve with position change. Patient says appetite has been poor during this time because she feels full. Says that attempts to eat/drink at home, though minimal, have not worsened her pain. At 1 AM this morning patient experienced nausea and non-bilious vomiting that prompted decision to come to the ED. Patient says she has never experienced this abdominal pain before. Denies recent changes to medications. Denies fevers, chills, SOB, chest pain. Says BM have been regular. Denies melena/hematochezia.     ED Course:   In the ED vitals were stable. Labs showed WBC 12.5, lipase 1,822. CT A/P showed inflammatory stranding in the upper and mid abdomen surrounding portions of the pancreas and multiple loops of small bowel. Patient was given morphine and IVF.    Medical history: Schizophrenia, Bipolar Mood Disorder, hypothyroidism, HTN, TMJ dysfunction    Risk/Social factors: Patient said that she quit smoking about 17 years ago. Had been smoking about a pack a day since she was 19 years old. Patient says she drinks a 32 oz mix of water and two shots of vodka every weekend and only on the weekend. Denies illicit drug use.    Surgical history: Patient denies past surgeries.     Family history: Father had history of Lymphoma. Mother has no known medical history to patient.     Objective     Physical Exam:  General:  Pleasant and cooperative. No apparent distress.  HEENT:  Normocephalic, atraumatic  Chest:  Clear to auscultation bilaterally. No wheezes, rales, or rhonchi.  CV:  Regular rate and rhythm. No murmurs    Abdomen: Abdomen is soft, epigastric abdominal tenderness, non-distended. BS +   Extremities:  No  "lower extremity edema or cyanosis.   Neurological:  AAOx3. No focal deficits.  Skin:  Warm and dry.    Last Recorded Vitals  Blood pressure 151/68, pulse 75, temperature 35.6 °C (96.1 °F), resp. rate 18, height 1.6 m (5' 3\"), weight 112 kg (247 lb 12.8 oz), SpO2 94%.  Intake/Output last 3 Shifts:  I/O last 3 completed shifts:  In: 999 (8.6 mL/kg) [IV Piggyback:999]  Out: - (0 mL/kg)   Weight: 115.7 kg     Last CBC & BMP  Lab Results   Component Value Date    GLUCOSE 111 (H) 04/26/2025    CALCIUM 9.1 04/26/2025     04/26/2025    K 4.1 04/26/2025    CO2 24 04/26/2025     04/26/2025    BUN 10 04/26/2025    CREATININE 0.85 04/26/2025     Lab Results   Component Value Date    WBC 12.5 (H) 04/26/2025    HGB 13.2 04/26/2025    HCT 40.9 04/26/2025    MCV 95 04/26/2025     04/26/2025         Assessment / Plan        Yisel Squires is a 64 y.o. female with a history of Schizophrenia, Bipolar Mood Disorder, hypothyroidism, HTN, TMJ dysfunction presents with abdominal pain.     Acute Medical conditions  # Acute pancreatitis   - Patient endorses 2-3 days of epigastric abdominal pressure. Episode of nausea and vomiting early this morning.   - No clear etiology for acute pancreatitis at this time. By patient's account she is not a heavy drinker. No changes in medication. No history of gallstones.   Plan:  - Ordered MRCP pancreas.   - Ordered Lipid panel, A1c  - Started continuous IVF  mL/hr   - PRN Tylenol 650 mg for pain  - PRN Zofran 4 mg for N/V  - Continue to advance diet as tolerated     Chronic Medical conditions  # Schizophrenia - Continue home caplyta.   # Bipolar Mood Disorder   # Hypothyroidism - Continue home synthroid.  # HTN - holding lisinopril to avoid exacerbation of pancreatitis.  # HLD - continue Pravastatin.   # GERD - continue Protonix.     DVT: Lovenox  IVF:  mL/hr   Diet: NPO until MRCP. Clear liquid diet after.   Code: Full Code  Consults: --       Eduardo Mathis, DO   PGY-1, " Internal Medicine  This is a preliminary note, please await attending attestation for final A/P

## 2025-04-27 VITALS
HEIGHT: 63 IN | OXYGEN SATURATION: 96 % | RESPIRATION RATE: 18 BRPM | WEIGHT: 247.8 LBS | SYSTOLIC BLOOD PRESSURE: 148 MMHG | HEART RATE: 73 BPM | BODY MASS INDEX: 43.91 KG/M2 | DIASTOLIC BLOOD PRESSURE: 67 MMHG | TEMPERATURE: 98.1 F

## 2025-04-27 LAB
ALBUMIN SERPL BCP-MCNC: 3.9 G/DL (ref 3.4–5)
ALP SERPL-CCNC: 73 U/L (ref 33–136)
ALT SERPL W P-5'-P-CCNC: 11 U/L (ref 7–45)
ANION GAP SERPL CALC-SCNC: 12 MMOL/L (ref 10–20)
AST SERPL W P-5'-P-CCNC: 14 U/L (ref 9–39)
BILIRUB SERPL-MCNC: 1.1 MG/DL (ref 0–1.2)
BUN SERPL-MCNC: 6 MG/DL (ref 6–23)
CALCIUM SERPL-MCNC: 8.9 MG/DL (ref 8.6–10.3)
CHLORIDE SERPL-SCNC: 103 MMOL/L (ref 98–107)
CO2 SERPL-SCNC: 25 MMOL/L (ref 21–32)
CREAT SERPL-MCNC: 0.69 MG/DL (ref 0.5–1.05)
EGFRCR SERPLBLD CKD-EPI 2021: >90 ML/MIN/1.73M*2
ERYTHROCYTE [DISTWIDTH] IN BLOOD BY AUTOMATED COUNT: 14.6 % (ref 11.5–14.5)
GLUCOSE SERPL-MCNC: 98 MG/DL (ref 74–99)
HCT VFR BLD AUTO: 36.5 % (ref 36–46)
HGB BLD-MCNC: 12 G/DL (ref 12–16)
MCH RBC QN AUTO: 30.8 PG (ref 26–34)
MCHC RBC AUTO-ENTMCNC: 32.9 G/DL (ref 32–36)
MCV RBC AUTO: 94 FL (ref 80–100)
NRBC BLD-RTO: 0 /100 WBCS (ref 0–0)
PLATELET # BLD AUTO: 278 X10*3/UL (ref 150–450)
POTASSIUM SERPL-SCNC: 3.5 MMOL/L (ref 3.5–5.3)
PROT SERPL-MCNC: 6 G/DL (ref 6.4–8.2)
RBC # BLD AUTO: 3.9 X10*6/UL (ref 4–5.2)
SODIUM SERPL-SCNC: 136 MMOL/L (ref 136–145)
TRIGL SERPL-MCNC: 95 MG/DL (ref 0–149)
WBC # BLD AUTO: 8.4 X10*3/UL (ref 4.4–11.3)

## 2025-04-27 PROCEDURE — 85027 COMPLETE CBC AUTOMATED: CPT

## 2025-04-27 PROCEDURE — 80321 ALCOHOLS BIOMARKERS 1OR 2: CPT | Performed by: INTERNAL MEDICINE

## 2025-04-27 PROCEDURE — 2500000001 HC RX 250 WO HCPCS SELF ADMINISTERED DRUGS (ALT 637 FOR MEDICARE OP)

## 2025-04-27 PROCEDURE — 80053 COMPREHEN METABOLIC PANEL: CPT

## 2025-04-27 PROCEDURE — 2500000005 HC RX 250 GENERAL PHARMACY W/O HCPCS

## 2025-04-27 PROCEDURE — 82784 ASSAY IGA/IGD/IGG/IGM EACH: CPT | Mod: PARLAB | Performed by: INTERNAL MEDICINE

## 2025-04-27 PROCEDURE — 2500000004 HC RX 250 GENERAL PHARMACY W/ HCPCS (ALT 636 FOR OP/ED): Mod: JZ

## 2025-04-27 PROCEDURE — 2500000002 HC RX 250 W HCPCS SELF ADMINISTERED DRUGS (ALT 637 FOR MEDICARE OP, ALT 636 FOR OP/ED)

## 2025-04-27 PROCEDURE — 36415 COLL VENOUS BLD VENIPUNCTURE: CPT | Performed by: INTERNAL MEDICINE

## 2025-04-27 PROCEDURE — 1200000002 HC GENERAL ROOM WITH TELEMETRY DAILY

## 2025-04-27 PROCEDURE — 99254 IP/OBS CNSLTJ NEW/EST MOD 60: CPT | Performed by: INTERNAL MEDICINE

## 2025-04-27 PROCEDURE — 84478 ASSAY OF TRIGLYCERIDES: CPT | Performed by: INTERNAL MEDICINE

## 2025-04-27 RX ORDER — TALC
6 POWDER (GRAM) TOPICAL NIGHTLY PRN
Status: DISCONTINUED | OUTPATIENT
Start: 2025-04-27 | End: 2025-04-28 | Stop reason: HOSPADM

## 2025-04-27 RX ORDER — SODIUM CHLORIDE, SODIUM LACTATE, POTASSIUM CHLORIDE, CALCIUM CHLORIDE 600; 310; 30; 20 MG/100ML; MG/100ML; MG/100ML; MG/100ML
150 INJECTION, SOLUTION INTRAVENOUS CONTINUOUS
Status: DISCONTINUED | OUTPATIENT
Start: 2025-04-27 | End: 2025-04-27

## 2025-04-27 RX ADMIN — VALBENAZINE 80 MG: 80 CAPSULE ORAL at 21:12

## 2025-04-27 RX ADMIN — LUMATEPERONE 42 MG: 42 CAPSULE ORAL at 21:05

## 2025-04-27 RX ADMIN — ACETAMINOPHEN 650 MG: 325 TABLET ORAL at 10:28

## 2025-04-27 RX ADMIN — XANOMELINE AND TROSPIUM CHLORIDE 1 CAPSULE: 20; 50 CAPSULE, COATED PELLETS ORAL at 21:06

## 2025-04-27 RX ADMIN — SODIUM CHLORIDE, SODIUM LACTATE, POTASSIUM CHLORIDE, AND CALCIUM CHLORIDE 150 ML/HR: .6; .31; .03; .02 INJECTION, SOLUTION INTRAVENOUS at 08:40

## 2025-04-27 RX ADMIN — PRAVASTATIN SODIUM 20 MG: 20 TABLET ORAL at 06:13

## 2025-04-27 RX ADMIN — ENOXAPARIN SODIUM 40 MG: 40 INJECTION SUBCUTANEOUS at 08:36

## 2025-04-27 RX ADMIN — ENOXAPARIN SODIUM 40 MG: 40 INJECTION SUBCUTANEOUS at 21:05

## 2025-04-27 RX ADMIN — XANOMELINE AND TROSPIUM CHLORIDE 1 CAPSULE: 20; 50 CAPSULE, COATED PELLETS ORAL at 08:37

## 2025-04-27 RX ADMIN — ALLOPURINOL 100 MG: 100 TABLET ORAL at 08:36

## 2025-04-27 RX ADMIN — Medication 6 MG: at 22:04

## 2025-04-27 RX ADMIN — SODIUM CHLORIDE, SODIUM LACTATE, POTASSIUM CHLORIDE, AND CALCIUM CHLORIDE 250 ML/HR: .6; .31; .03; .02 INJECTION, SOLUTION INTRAVENOUS at 07:47

## 2025-04-27 RX ADMIN — LEVOTHYROXINE SODIUM 75 MCG: 0.07 TABLET ORAL at 10:26

## 2025-04-27 RX ADMIN — ALLOPURINOL 100 MG: 100 TABLET ORAL at 21:05

## 2025-04-27 RX ADMIN — PANTOPRAZOLE SODIUM 20 MG: 20 TABLET, DELAYED RELEASE ORAL at 06:13

## 2025-04-27 ASSESSMENT — COGNITIVE AND FUNCTIONAL STATUS - GENERAL
DAILY ACTIVITIY SCORE: 24
MOBILITY SCORE: 24

## 2025-04-27 ASSESSMENT — PAIN SCALES - GENERAL: PAINLEVEL_OUTOF10: 5 - MODERATE PAIN

## 2025-04-27 ASSESSMENT — PAIN DESCRIPTION - DESCRIPTORS: DESCRIPTORS: ACHING

## 2025-04-27 ASSESSMENT — PAIN - FUNCTIONAL ASSESSMENT: PAIN_FUNCTIONAL_ASSESSMENT: 0-10

## 2025-04-27 ASSESSMENT — PAIN DESCRIPTION - LOCATION: LOCATION: HEAD

## 2025-04-27 NOTE — CONSULTS
Department of Gastroenterology & Hepatology  Initial Consult Note  Date of Service:  April 27, 2025         Patient: Yisel Squires    Medical Record: [id number]  Reason for Admission / Consultation: Pancreatitis  Gastroenterology Attending: Mohit Morel MD  Referring Provider: JAIRO Donaldson-CNP No referring provider defined for this encounter.         My final recommendations will be communicated back to the requesting physician by way of shared medical record or letter via US mail         Impression: 64-year-old female with past medical history of hypertension hypothyroidism bipolar, alcohol use admitted for pancreatitis    Had high lipase with imaging findings of pancreatitis.  No gallstones or biliary ductal dilation or filling defects    Her pancreatitis is likely from alcohol use, although she also takes GOLO diet supplement which might be an cause of pancreatitis    At this time her pain has resolved with conservative management  I will recommend checking PETH, triglyceride and IgG4 levels  She has already stopped taking Golo  No further GI interventions at this time  She can follow-up with us in GI clinic after discharge      Mohit Morel MD  Gastroenterology, Hepatology and Nutrition  [facility]  =========================================================================  History of Present Illness:     Yisel Squires  is a 64 y.o. female with past medical history of hypertension, hypothyroidism, bipolar and schizophrenia history of past marijuana use who presented with 2 days history of upper mid abdominal pain.  Patient states states that she was not feeling well for the past few days with dull achy pain in her upper abdominal which progressively got worse and was 7-8 out of 10 on presentation associated with nausea and one vomiting episode.  She denies any diarrhea or blood in stool.  No history of fever or chills.   Her pain has improved since then and she is tolerating  "regular diet      Patient has been taking GOLO weight loss supplement for the last 1 month and stopped when her pain started few days ago.   She also drinks 4 shots of vodka every Friday Saturday and Sunday.  Her last alcohol use was on Friday when her pain got worse.        Pertinent Review of Systems:    Per HPI rest 12 point ROS negative      Past Medical History:    Medical History[1]     Past Surgical History:  Surgical History[2]     Family History:  Family History[3]     Social History:  Social History     Tobacco Use    Smoking status: Never    Smokeless tobacco: Never   Substance Use Topics    Alcohol use: Yes     Comment: MODERATELY        Allergies:  RX Allergies[4]      Medications:  Medications Ordered Prior to Encounter[5]         Physical Exam:  /64   Pulse 69   Temp 36 °C (96.8 °F)   Resp 17   Ht 1.6 m (5' 3\")   Wt 112 kg (247 lb 12.8 oz)   SpO2 94%   BMI 43.90 kg/m²    General appearance: well appearing, alert, in no acute distress  Skin:  no rashes or lesions  Head: normal  Eyes: Anicteric sclera.   ENT: No oral erythema  Lungs: lungs clear to auscultation, no wheezing or rhonchi  Heart: RRR without murmur  Abdomen:  Abdomen soft, non-tender. Bowel sounds normal.   Extremities: Extremities normal.   Musculoskeletal: Muscular strength grossly intact  Neuro: CN2-12 grossly intact     Labs:  Current Medications[6]    Results for orders placed or performed during the hospital encounter of 04/26/25 (from the past 96 hours)   CBC and Auto Differential   Result Value Ref Range    WBC 12.5 (H) 4.4 - 11.3 x10*3/uL    nRBC 0.0 0.0 - 0.0 /100 WBCs    RBC 4.32 4.00 - 5.20 x10*6/uL    Hemoglobin 13.2 12.0 - 16.0 g/dL    Hematocrit 40.9 36.0 - 46.0 %    MCV 95 80 - 100 fL    MCH 30.6 26.0 - 34.0 pg    MCHC 32.3 32.0 - 36.0 g/dL    RDW 14.3 11.5 - 14.5 %    Platelets 322 150 - 450 x10*3/uL    Neutrophils % 82.7 40.0 - 80.0 %    Immature Granulocytes %, Automated 0.3 0.0 - 0.9 %    Lymphocytes % 10.6 " 13.0 - 44.0 %    Monocytes % 5.9 2.0 - 10.0 %    Eosinophils % 0.2 0.0 - 6.0 %    Basophils % 0.3 0.0 - 2.0 %    Neutrophils Absolute 10.32 (H) 1.20 - 7.70 x10*3/uL    Immature Granulocytes Absolute, Automated 0.04 0.00 - 0.70 x10*3/uL    Lymphocytes Absolute 1.32 1.20 - 4.80 x10*3/uL    Monocytes Absolute 0.74 0.10 - 1.00 x10*3/uL    Eosinophils Absolute 0.03 0.00 - 0.70 x10*3/uL    Basophils Absolute 0.04 0.00 - 0.10 x10*3/uL   Comprehensive metabolic panel   Result Value Ref Range    Glucose 111 (H) 74 - 99 mg/dL    Sodium 137 136 - 145 mmol/L    Potassium 4.1 3.5 - 5.3 mmol/L    Chloride 102 98 - 107 mmol/L    Bicarbonate 24 21 - 32 mmol/L    Anion Gap 15 10 - 20 mmol/L    Urea Nitrogen 10 6 - 23 mg/dL    Creatinine 0.85 0.50 - 1.05 mg/dL    eGFR 78 >60 mL/min/1.73m*2    Calcium 9.1 8.6 - 10.3 mg/dL    Albumin 4.3 3.4 - 5.0 g/dL    Alkaline Phosphatase 84 33 - 136 U/L    Total Protein 6.6 6.4 - 8.2 g/dL    AST 16 9 - 39 U/L    Bilirubin, Total 0.7 0.0 - 1.2 mg/dL    ALT 14 7 - 45 U/L   Magnesium   Result Value Ref Range    Magnesium 1.84 1.60 - 2.40 mg/dL   Lipase   Result Value Ref Range    Lipase 1,822 (H) 9 - 82 U/L   Lipid panel   Result Value Ref Range    Cholesterol 160 0 - 199 mg/dL    HDL-Cholesterol 80.3 mg/dL    Cholesterol/HDL Ratio 2.0     LDL Calculated 52 <=99 mg/dL    VLDL 28 0 - 40 mg/dL    Triglycerides 140 0 - 149 mg/dL    Non HDL Cholesterol 80 0 - 149 mg/dL   Hemoglobin A1c   Result Value Ref Range    Hemoglobin A1C 5.8 (H) See comment %    Estimated Average Glucose 120 Not Established mg/dL   Urinalysis with Reflex Culture and Microscopic   Result Value Ref Range    Color, Urine Colorless (N) Light-Yellow, Yellow, Dark-Yellow    Appearance, Urine Clear Clear    Specific Gravity, Urine 1.016 1.005 - 1.035    pH, Urine 6.0 5.0, 5.5, 6.0, 6.5, 7.0, 7.5, 8.0    Protein, Urine NEGATIVE NEGATIVE, 10 (TRACE), 20 (TRACE) mg/dL    Glucose, Urine Normal Normal mg/dL    Blood, Urine NEGATIVE NEGATIVE  mg/dL    Ketones, Urine NEGATIVE NEGATIVE mg/dL    Bilirubin, Urine NEGATIVE NEGATIVE mg/dL    Urobilinogen, Urine Normal Normal mg/dL    Nitrite, Urine NEGATIVE NEGATIVE    Leukocyte Esterase, Urine NEGATIVE NEGATIVE   Extra Urine Gray Tube   Result Value Ref Range    Extra Tube Hold for add-ons.    CBC   Result Value Ref Range    WBC 8.4 4.4 - 11.3 x10*3/uL    nRBC 0.0 0.0 - 0.0 /100 WBCs    RBC 3.90 (L) 4.00 - 5.20 x10*6/uL    Hemoglobin 12.0 12.0 - 16.0 g/dL    Hematocrit 36.5 36.0 - 46.0 %    MCV 94 80 - 100 fL    MCH 30.8 26.0 - 34.0 pg    MCHC 32.9 32.0 - 36.0 g/dL    RDW 14.6 (H) 11.5 - 14.5 %    Platelets 278 150 - 450 x10*3/uL   Comprehensive Metabolic Panel   Result Value Ref Range    Glucose 98 74 - 99 mg/dL    Sodium 136 136 - 145 mmol/L    Potassium 3.5 3.5 - 5.3 mmol/L    Chloride 103 98 - 107 mmol/L    Bicarbonate 25 21 - 32 mmol/L    Anion Gap 12 10 - 20 mmol/L    Urea Nitrogen 6 6 - 23 mg/dL    Creatinine 0.69 0.50 - 1.05 mg/dL    eGFR >90 >60 mL/min/1.73m*2    Calcium 8.9 8.6 - 10.3 mg/dL    Albumin 3.9 3.4 - 5.0 g/dL    Alkaline Phosphatase 73 33 - 136 U/L    Total Protein 6.0 (L) 6.4 - 8.2 g/dL    AST 14 9 - 39 U/L    Bilirubin, Total 1.1 0.0 - 1.2 mg/dL    ALT 11 7 - 45 U/L      MRCP pancreas w and wo IV contrast  Result Date: 4/26/2025  Interpreted By:  Gray Rosa, STUDY: MRCP PANCREAS W AND WO IV CONTRAST;  4/26/2025 1:37 pm   INDICATION: Signs/Symptoms:Acute pancreatitis with unclear etiology.     COMPARISON: CT same day   ACCESSION NUMBER(S): [id number]   ORDERING CLINICIAN: FELECIA GUARDADO   TECHNIQUE: MRI PANCREAS; Multiplanar magnetic resonance images of the abdomen were obtained including the following sequences; T2-weighted SSFSE with and without fat saturation, T1-weighted GRE in/opposed phase, DWI, fat saturated 3D-T1w GRE pre and dynamically post contrast. Radial thick slab T2w RARE MRCP and coronally reconstructed navigator gated high resolution 3-D T2w RESTORE MRCP with MIP  reconstruction were also performed for MRCP.  20 ML of Dotarem was administered intravenously without immediate complication.   FINDINGS: LIVER: No signal changes of steatosis. No focal lesions.   BILE DUCTS: No dilatation. Common bile duct measures 4-5 mm. No filling defect to indicate choledocholithiasis. No biliary stricture or extrinsic compression identified.   GALLBLADDER: No stone or wall thickening.   PANCREAS: Mild diffuse peripancreatic edema. No fluid collection. Diffuse enhancement without necrosis. Mild displacement of the head by a small duodenal diverticulum. No main ductal dilatation. No pancreas divisum. No fluid collection.   SPLEEN: Unremarkable.   ADRENAL GLANDS: Wall.   KIDNEYS: Unremarkable without hydronephrosis.   LYMPH NODES: No lymphadenopathy.   ABDOMINAL VESSELS: Abdominal aorta is atherosclerotic but otherwise patent without aneurysm. The major visceral arterial branches are patent. IVC and visualized major branches are patent. Major portal vein venous system branches are patent, and including the entire splenic vein.   BOWEL: No dilated bowel is visualized.   PERITONEUM/RETROPERITONEUM: No visualized free fluid.   BONES AND LOWER THORAX: No abnormal marrow enhancement. Grossly clear lung bases.         1.  Mild diffuse acute interstitial edematous pancreatitis without fluid collection or necrosis. No main ductal dilatation or pancreas divisum. 2. Unremarkable hepatobiliary system. No biliary tract stone or obstruction.     MACRO: None   Signed by: Gray Rosa 4/26/2025 4:10 PM Dictation workstation:   [id number]    CT abdomen pelvis w IV contrast  Result Date: 4/26/2025  Interpreted By:  Finkelstein, Evan, STUDY: CT ABDOMEN PELVIS W IV CONTRAST;  4/26/2025 3:36 am   INDICATION: Signs/Symptoms:PAIN.     COMPARISON: None.   ACCESSION NUMBER(S): [id number]   ORDERING CLINICIAN: MARGARITA DE LA GARZA   TECHNIQUE: Axial CT images of the abdomen and pelvis with coronal and sagittal  reconstructed images obtained after intravenous administration of 75 mL Omnipaque 350   FINDINGS: LOWER CHEST: No acute abnormality of the lung bases.   ABDOMEN:   LIVER: Normal attenuation and contour. BILE DUCTS: Normal caliber. GALLBLADDER: No calcified gallstones. No wall thickening. PORTAL VEIN: Patent SPLEEN: Unremarkable. PANCREAS: There is peripancreatic inflammatory stranding. The pancreas is otherwise homogeneous in attenuation. ADRENALS: Unremarkable. KIDNEYS, URETERS and URINARY BLADDER: Symmetric renal enhancement. No hydronephrosis or perinephric fluid collection. The bladder is distended without focal bladder wall thickening. REPRODUCTIVE ORGANS: No pelvic masses.   ABDOMINAL WALL: Within normal limits. PERITONEUM: No free air. Inflammatory stranding throughout the upper and mid abdominal mesentery.   BOWEL: Gastric wall thickening versus underdistention. Inflammatory stranding surrounding small bowel in the upper and mid abdomen. There are scattered colonic diverticula without pericolonic inflammatory stranding. Nondilated appendix. Loops of small bowel appear to be matted against the anterior abdominal wall. No dilated bowel to suggest obstruction.   VESSELS: No aortic aneurysm. Mild aortoiliac calcifications. RETROPERITONEUM: No pathologically enlarged retroperitoneal lymph nodes.   BONES: No acute osseous abnormality.       Inflammatory stranding in the upper and mid abdomen surrounding portions of the pancreas as well as multiple loops of small bowel. Differential includes enteritis or pancreatitis. Correlate with clinical symptoms and lipase values   Gastric wall thickening versus underdistention. Correlate for symptoms of gastritis.   Scattered colonic diverticulosis without CT evidence of acute diverticulitis.     MACRO: None.   Signed by: Evan Finkelstein 4/26/2025 4:22 AM Dictation workstation:   [id number]        SIGNATURE: Mohit Morel MD PATIENT NAME: Yisel Squires   DATE: April  27, 2025 MRN: [id number]   TIME: 6:29 PM             [1] No past medical history on file.  [2] No past surgical history on file.  [3] No family history on file.  [4] No Known Allergies  [5]   No current facility-administered medications on file prior to encounter.     Current Outpatient Medications on File Prior to Encounter   Medication Sig Dispense Refill    Caplyta 42 mg capsule Take 1 capsule (42 mg) by mouth.      Ingrezza 80 mg capsule       levothyroxine (Synthroid, Levoxyl) 75 mcg tablet Take 1 tablet (75 mcg) by mouth once daily.      pravastatin (Pravachol) 20 mg tablet Take 1 tablet (20 mg) by mouth once daily.      allopurinol (Zyloprim) 100 mg tablet Take 1 tablet (100 mg) by mouth 2 times a day.      calcium carbonate-vitamin D3 500 mg-5 mcg (200 unit) tablet Take 1 tablet by mouth once daily. Take after lunch      esomeprazole (NexIUM) 20 mg DR capsule Take 1 capsule (20 mg) by mouth once daily.      lisinopril 10 mg tablet Take 1 tablet (10 mg) by mouth once daily.      multivitamin tablet Take 1 tablet by mouth once daily.      oxyCODONE-acetaminophen (Percocet) 5-325 mg tablet Take 1 tablet by mouth 4 times a day as needed for severe pain (7 - 10). 10 tablet 0    xanomeline-trospium (Cobenfy) 50-20 mg capsule Take 1 tablet by mouth 2 times a day.      [DISCONTINUED] lisinopril 10 mg tablet Take 1 tablet (10 mg) by mouth once daily.     [6]   Current Facility-Administered Medications:     acetaminophen (Tylenol) tablet 650 mg, 650 mg, oral, q4h PRN, 650 mg at 04/27/25 1028 **OR** acetaminophen (Tylenol) oral liquid 650 mg, 650 mg, oral, q4h PRN **OR** acetaminophen (Tylenol) suppository 650 mg, 650 mg, rectal, q4h PRN, Spencer Diamond DO    allopurinol (Zyloprim) tablet 100 mg, 100 mg, oral, BID, Eduardo Le V, DO, 100 mg at 04/27/25 0836    enoxaparin (Lovenox) syringe 40 mg, 40 mg, subcutaneous, q12h THAIS, Spencer Diamond DO, 40 mg at 04/27/25 0836    levothyroxine (Synthroid, Levoxyl) tablet 75 mcg, 75 mcg,  oral, Daily, Eduardo Le V, DO, 75 mcg at 04/27/25 1026    [Held by provider] lisinopril tablet 10 mg, 10 mg, oral, Daily, Eduardo Le V, DO    lumateperone (Caplyta) capsule 42 mg, 42 mg, oral, Nightly, Eduardo Le V, DO, 42 mg at 04/26/25 2039    melatonin tablet 6 mg, 6 mg, oral, Nightly PRN, Eduardo Le V, DO    morphine injection 4 mg, 4 mg, intravenous, Once, Spencer Diamond DO    ondansetron ODT (Zofran-ODT) disintegrating tablet 4 mg, 4 mg, oral, q8h PRN **OR** ondansetron (Zofran) injection 4 mg, 4 mg, intravenous, q8h PRN, Spencer Diamond DO, 4 mg at 04/26/25 2252    pantoprazole (ProtoNix) EC tablet 20 mg, 20 mg, oral, Daily before breakfast, Eduardo Le V, DO, 20 mg at 04/27/25 0613    polyethylene glycol (Glycolax, Miralax) packet 17 g, 17 g, oral, Daily, Spencer Diamond DO    pravastatin (Pravachol) tablet 20 mg, 20 mg, oral, Daily, Eduardo Le V, DO, 20 mg at 04/27/25 0613    valbenazine tosylate (Ingrezza) capsule 80 mg, 80 mg, oral, Daily, Eduardo Le V, DO, 80 mg at 04/26/25 2039    xanomeline-trospium 50-20 mg capsule 1 capsule, 1 capsule, oral, BID, Eduardo Le V, DO, 1 capsule at 04/27/25 0837

## 2025-04-27 NOTE — HOSPITAL COURSE
Yisel Squires is a 64 y.o. female with a history of Schizophrenia, Bipolar Mood Disorder, hypothyroidism, HTN, TMJ dysfunction presents with abdominal pain. In the ED vitals were stable. Labs showed WBC 12.5, lipase 1,822. CT A/P showed inflammatory stranding in the upper and mid abdomen surrounding portions of the pancreas and multiple loops of small bowel. Patient was given morphine and started on IVF in the ED. She did not require any additional support for pain while on the floors. Etiology for acute pancreatitis was unclear. She had no history of hepatobiliary issues or heavy alcohol use from her accounts. Lipid panel within normal limits. MRCP was ordered and showed edematous pancreatitis without main ductal dilation or pancreas divisum. Hepatobiliary system was unremarkable overall. Patient was continued on IVF and diet was advanced as tolerated. Patient endorsed improvement of epigastric abdominal pain. She will be discharged home and is instructed to follow up with her primary care provider in 1 week.

## 2025-04-27 NOTE — PROGRESS NOTES
Yisel Squires is a 64 y.o. female on day 1 of admission presenting with Acute pancreatitis, unspecified complication status, unspecified pancreatitis type ([facility]).      Assessment / Plan        Yisel Squires is a 64 y.o. female with a history of Schizophrenia, Bipolar Mood Disorder, hypothyroidism, HTN, TMJ dysfunction presents with abdominal pain.      Acute Medical conditions  # Acute pancreatitis   - Patient endorses 2-3 days of epigastric abdominal pressure. Episode of nausea and vomiting early this morning.   - No clear etiology for acute pancreatitis at this time. By patient's account she is not a heavy drinker. No changes in medication. No history of gallstones.   - MRCP pancreas 4/27: showed edematous pancreatitis without main ductal dilation or pancreas divisum. Hepatobiliary system was unremarkable overall.   - Lipid panel unremarkable.   Plan:  - Discontinued IV fluids  - Advanced diet to regular  - GI consulted as etiology of acute pancreatitis remains unclear. Would like patient to be able to follow up with GI. Appreciate recommendations.   - PRN Tylenol 650 mg for pain  - PRN Zofran 4 mg for N/V     Chronic Medical conditions  # Schizophrenia - Continue home caplyta.   # Bipolar Mood Disorder   # Hypothyroidism - Continue home synthroid.  # HTN - holding lisinopril to avoid exacerbation of pancreatitis.  # HLD - continue Pravastatin.   # GERD - continue Protonix.      DVT: Lovenox  IVF: --  Diet: Regular  Code: Full Code  Consults: --         Subjective     No acute events overnight. Patient tolerated clear liquid diet. Denies N/V. Says that abdominal pain has improved. Rates pain 3-4/10.     Objective     Physical Exam:  General:  Pleasant and cooperative. No apparent distress.  HEENT:  Normocephalic, atraumatic  Chest:  Clear to auscultation bilaterally. No wheezes, rales, or rhonchi.  CV:  Regular rate and rhythm. No murmurs    Abdomen: Abdomen is soft, mild epigastric abdominal pain,  "non-distended. BS +   Extremities:  No lower extremity edema or cyanosis.   Neurological:  AAOx3. No focal deficits.  Skin:  Warm and dry.    Last Recorded Vitals  Blood pressure 134/63, pulse 80, temperature 36.2 °C (97.2 °F), resp. rate 17, height 1.6 m (5' 3\"), weight 112 kg (247 lb 12.8 oz), SpO2 94%.  Intake/Output last 3 Shifts:  I/O last 3 completed shifts:  In: 999 (8.9 mL/kg) [IV Piggyback:999]  Out: - (0 mL/kg)   Weight: 112.4 kg     Last CBC & BMP  Lab Results   Component Value Date    GLUCOSE 98 04/27/2025    CALCIUM 8.9 04/27/2025     04/27/2025    K 3.5 04/27/2025    CO2 25 04/27/2025     04/27/2025    BUN 6 04/27/2025    CREATININE 0.69 04/27/2025     Lab Results   Component Value Date    WBC 8.4 04/27/2025    HGB 12.0 04/27/2025    HCT 36.5 04/27/2025    MCV 94 04/27/2025     04/27/2025     Eduardo Mathis, DO   PGY-1, Internal Medicine  This is a preliminary note, please await attending attestation for final A/P  "

## 2025-04-28 VITALS
HEART RATE: 70 BPM | HEIGHT: 63 IN | SYSTOLIC BLOOD PRESSURE: 161 MMHG | WEIGHT: 247.8 LBS | DIASTOLIC BLOOD PRESSURE: 74 MMHG | RESPIRATION RATE: 18 BRPM | BODY MASS INDEX: 43.91 KG/M2 | OXYGEN SATURATION: 94 % | TEMPERATURE: 96.8 F

## 2025-04-28 LAB
ALBUMIN SERPL BCP-MCNC: 4.1 G/DL (ref 3.4–5)
ALP SERPL-CCNC: 73 U/L (ref 33–136)
ALT SERPL W P-5'-P-CCNC: 12 U/L (ref 7–45)
ANION GAP SERPL CALC-SCNC: 11 MMOL/L (ref 10–20)
AST SERPL W P-5'-P-CCNC: 14 U/L (ref 9–39)
BILIRUB SERPL-MCNC: 0.7 MG/DL (ref 0–1.2)
BUN SERPL-MCNC: 8 MG/DL (ref 6–23)
CALCIUM SERPL-MCNC: 9.4 MG/DL (ref 8.6–10.3)
CHLORIDE SERPL-SCNC: 102 MMOL/L (ref 98–107)
CO2 SERPL-SCNC: 28 MMOL/L (ref 21–32)
CREAT SERPL-MCNC: 0.69 MG/DL (ref 0.5–1.05)
EGFRCR SERPLBLD CKD-EPI 2021: >90 ML/MIN/1.73M*2
ERYTHROCYTE [DISTWIDTH] IN BLOOD BY AUTOMATED COUNT: 14.2 % (ref 11.5–14.5)
GLUCOSE SERPL-MCNC: 116 MG/DL (ref 74–99)
HCT VFR BLD AUTO: 39 % (ref 36–46)
HGB BLD-MCNC: 12.3 G/DL (ref 12–16)
IGG SERPL-MCNC: 641 MG/DL (ref 700–1600)
IGG1 SER-MCNC: 529 MG/DL (ref 490–1140)
IGG2 SER-MCNC: 129 MG/DL (ref 150–640)
IGG3 SER-MCNC: 17 MG/DL (ref 11–85)
IGG4 SER-MCNC: 11 MG/DL (ref 3–200)
IGG4 SER-MCNC: 11 MG/DL (ref 3–200)
MCH RBC QN AUTO: 30 PG (ref 26–34)
MCHC RBC AUTO-ENTMCNC: 31.5 G/DL (ref 32–36)
MCV RBC AUTO: 95 FL (ref 80–100)
NRBC BLD-RTO: 0 /100 WBCS (ref 0–0)
PLATELET # BLD AUTO: 290 X10*3/UL (ref 150–450)
POTASSIUM SERPL-SCNC: 3.9 MMOL/L (ref 3.5–5.3)
PROT SERPL-MCNC: 6.4 G/DL (ref 6.4–8.2)
RBC # BLD AUTO: 4.1 X10*6/UL (ref 4–5.2)
SODIUM SERPL-SCNC: 137 MMOL/L (ref 136–145)
WBC # BLD AUTO: 8.4 X10*3/UL (ref 4.4–11.3)

## 2025-04-28 PROCEDURE — 82787 IGG 1 2 3 OR 4 EACH: CPT | Mod: PARLAB

## 2025-04-28 PROCEDURE — 85027 COMPLETE CBC AUTOMATED: CPT

## 2025-04-28 PROCEDURE — 80321 ALCOHOLS BIOMARKERS 1OR 2: CPT

## 2025-04-28 PROCEDURE — 80053 COMPREHEN METABOLIC PANEL: CPT

## 2025-04-28 PROCEDURE — 2500000001 HC RX 250 WO HCPCS SELF ADMINISTERED DRUGS (ALT 637 FOR MEDICARE OP)

## 2025-04-28 PROCEDURE — 2500000002 HC RX 250 W HCPCS SELF ADMINISTERED DRUGS (ALT 637 FOR MEDICARE OP, ALT 636 FOR OP/ED)

## 2025-04-28 PROCEDURE — 36415 COLL VENOUS BLD VENIPUNCTURE: CPT

## 2025-04-28 PROCEDURE — 99232 SBSQ HOSP IP/OBS MODERATE 35: CPT | Performed by: NURSE PRACTITIONER

## 2025-04-28 PROCEDURE — 2500000004 HC RX 250 GENERAL PHARMACY W/ HCPCS (ALT 636 FOR OP/ED): Mod: JZ

## 2025-04-28 RX ADMIN — XANOMELINE AND TROSPIUM CHLORIDE 1 CAPSULE: 20; 50 CAPSULE, COATED PELLETS ORAL at 09:32

## 2025-04-28 RX ADMIN — PANTOPRAZOLE SODIUM 20 MG: 20 TABLET, DELAYED RELEASE ORAL at 06:05

## 2025-04-28 RX ADMIN — PRAVASTATIN SODIUM 20 MG: 20 TABLET ORAL at 06:06

## 2025-04-28 RX ADMIN — ALLOPURINOL 100 MG: 100 TABLET ORAL at 09:32

## 2025-04-28 RX ADMIN — LEVOTHYROXINE SODIUM 75 MCG: 0.07 TABLET ORAL at 09:32

## 2025-04-28 RX ADMIN — ENOXAPARIN SODIUM 40 MG: 40 INJECTION SUBCUTANEOUS at 09:32

## 2025-04-28 ASSESSMENT — PAIN SCALES - GENERAL: PAINLEVEL_OUTOF10: 0 - NO PAIN

## 2025-04-28 NOTE — PROGRESS NOTES
"Yisel Squires is a 64 y.o. female on day 2 of admission presenting with Acute pancreatitis, unspecified complication status, unspecified pancreatitis type ([facility]).    Subjective   4/28/25 reports much improvement today. Abdominal pain and nausea resolved. Ate solid breakfast today and tolerated well. No BM yet but passing gas. No overt bleeding. HH stable       Objective     The note was created using voice recognition transcription software. Despite proofreading, unintentional typographical errors may be present. Please contact the GI office with any questions or concerns.     Current Medications: reviewed    A 10 point review of system is negative except for what is mentioned in the HPI    Vital Signs: Reviewed    Physical Exam:  General: no apparent distress, pleasant and cooperative  Skin:  Warm and dry, no jaundice  HEENT: No scleral icterus, no conjunctival pallor, normocephalic, atraumatic, mucous membranes moist  Neck:  atraumatic, trachea midline, no JVD  Chest:  decreased air entry to auscultation bilaterally. No wheezes, rales, or rhonchi  CV:  Regular rate and rhythm.  Positive S1/S2  Abdomen: no distension, +BS, soft, non-tender to palpation, no rebound tenderness, no guarding, no rigidity, no discernible ascites   Extremities: no lower extremity edema, Chronic pigmentary changes, no cyanosis  Neurological:  A&Ox3 , no asterixis  Psychiatric: cooperative     Investigations:  Labs, radiological imaging and cardiac work up were reviewed    Last Recorded Vitals  Blood pressure 161/74, pulse 70, temperature 36 °C (96.8 °F), temperature source Temporal, resp. rate 18, height 1.6 m (5' 3\"), weight 112 kg (247 lb 12.8 oz), SpO2 94%.  Intake/Output last 3 Shifts:  I/O last 3 completed shifts:  In: 2500 (22.2 mL/kg) [P.O.:300; I.V.:2200 (19.6 mL/kg)]  Out: - (0 mL/kg)   Weight: 112.4 kg     Relevant Results        Scheduled medications  Scheduled Medications[1]  Continuous medications  Continuous " Medications[2]  PRN medications  PRN Medications[3]    Results for orders placed or performed during the hospital encounter of 04/26/25 (from the past 24 hours)   Triglyceride   Result Value Ref Range    Triglycerides 95 0 - 149 mg/dL   CBC   Result Value Ref Range    WBC 8.4 4.4 - 11.3 x10*3/uL    nRBC 0.0 0.0 - 0.0 /100 WBCs    RBC 4.10 4.00 - 5.20 x10*6/uL    Hemoglobin 12.3 12.0 - 16.0 g/dL    Hematocrit 39.0 36.0 - 46.0 %    MCV 95 80 - 100 fL    MCH 30.0 26.0 - 34.0 pg    MCHC 31.5 (L) 32.0 - 36.0 g/dL    RDW 14.2 11.5 - 14.5 %    Platelets 290 150 - 450 x10*3/uL   Comprehensive Metabolic Panel   Result Value Ref Range    Glucose 116 (H) 74 - 99 mg/dL    Sodium 137 136 - 145 mmol/L    Potassium 3.9 3.5 - 5.3 mmol/L    Chloride 102 98 - 107 mmol/L    Bicarbonate 28 21 - 32 mmol/L    Anion Gap 11 10 - 20 mmol/L    Urea Nitrogen 8 6 - 23 mg/dL    Creatinine 0.69 0.50 - 1.05 mg/dL    eGFR >90 >60 mL/min/1.73m*2    Calcium 9.4 8.6 - 10.3 mg/dL    Albumin 4.1 3.4 - 5.0 g/dL    Alkaline Phosphatase 73 33 - 136 U/L    Total Protein 6.4 6.4 - 8.2 g/dL    AST 14 9 - 39 U/L    Bilirubin, Total 0.7 0.0 - 1.2 mg/dL    ALT 12 7 - 45 U/L       * Cannot find OR log *  Last relevant procedure:        Assessment & Plan  Acute pancreatitis, unspecified complication status, unspecified pancreatitis type ([facility])    Yisel Squires is  64-year-old female with past medical history of hypertension hypothyroidism bipolar, alcohol use, admitted for pancreatitis     She had high lipase with imaging findings of pancreatitis.  No gallstones or biliary ductal dilation or filling defects     Her pancreatitis is likely from alcohol use, although she also takes GOLO diet supplement which might be an cause of pancreatitis     At this time her pain has resolved with conservative management  Triglycerides returned non-elevated   PETH and IgG4 levels still pending  She has already stopped taking Golo  No further GI interventions at this time    Recommend follow up with GI outpt in 4 weeks, GI  notified    Pt discussed with dr Morel        I spent 30 minutes in the professional and overall care of this patient.      Courtney Card, APRN-CNP           [1] allopurinol, 100 mg, oral, BID  enoxaparin, 40 mg, subcutaneous, q12h THAIS  levothyroxine, 75 mcg, oral, Daily  [Held by provider] lisinopril, 10 mg, oral, Daily  lumateperone, 42 mg, oral, Nightly  morphine, 4 mg, intravenous, Once  pantoprazole, 20 mg, oral, Daily before breakfast  polyethylene glycol, 17 g, oral, Daily  pravastatin, 20 mg, oral, Daily  valbenazine tosylate, 80 mg, oral, Daily  xanomeline-trospium, 1 capsule, oral, BID  [2]    [3] PRN medications: acetaminophen **OR** acetaminophen **OR** acetaminophen, melatonin, ondansetron ODT **OR** ondansetron

## 2025-04-28 NOTE — DISCHARGE INSTRUCTIONS
Follow up with your primary care provider in 1 week.    Follow up with Dr. Morel (Gastroenterology) in 1 month.

## 2025-04-28 NOTE — DISCHARGE SUMMARY
Discharge Diagnosis  Acute pancreatitis    Discharge Meds     Medication List      CONTINUE taking these medications     allopurinol 100 mg tablet; Commonly known as: Zyloprim   calcium carbonate-vitamin D3 500 mg-5 mcg (200 unit) tablet   Caplyta 42 mg capsule; Generic drug: lumateperone   Cobenfy 50-20 mg capsule; Generic drug: xanomeline-trospium   esomeprazole 20 mg DR capsule; Commonly known as: NexIUM   Ingrezza 80 mg capsule; Generic drug: valbenazine tosylate   levothyroxine 75 mcg tablet; Commonly known as: Synthroid, Levoxyl   lisinopril 10 mg tablet   multivitamin tablet   oxyCODONE-acetaminophen 5-325 mg tablet; Commonly known as: Percocet;   Take 1 tablet by mouth 4 times a day as needed for severe pain (7 - 10).   pravastatin 20 mg tablet; Commonly known as: Pravachol       Test Results Pending At Discharge  Pending Labs       Order Current Status    IgG 1, 2, 3, and 4 In process    IgG 4 In process    Phosphatidylethanol (PEth), Whole Blood, Quantitative In process    Phosphatidylethanol (PEth), Whole Blood, Quantitative In process            Hospital Course  Yisel Squires is a 64 y.o. female with a history of Schizophrenia, Bipolar Mood Disorder, hypothyroidism, HTN, TMJ dysfunction presents with abdominal pain. In the ED vitals were stable. Labs showed WBC 12.5, lipase 1,822. CT A/P showed inflammatory stranding in the upper and mid abdomen surrounding portions of the pancreas and multiple loops of small bowel. Patient was given morphine and started on IVF in the ED. She did not require any additional support for pain while on the floors. Etiology for acute pancreatitis was unclear. She had no history of hepatobiliary issues or heavy alcohol use from her accounts. Lipid panel within normal limits. MRCP was ordered and showed edematous pancreatitis without main ductal dilation or pancreas divisum. Hepatobiliary system was unremarkable overall. GI was consulted and believe acute pancreatitis likely  from alcohol use vs GOLO dietary supplement. Recommended alcohol cessation and that patient discontinue dietary supplement. Patient was continued on IVF and diet was advanced as tolerated. Patient endorsed improvement of epigastric abdominal pain. She will be discharged home and is instructed to follow up with her primary care provider in 1 week. She will continue to follow up with GI outpatient.    Pertinent Physical Exam At Time of Discharge  General:  Pleasant and cooperative. No apparent distress.  HEENT:  Normocephalic, atraumatic  Chest:  Clear to auscultation bilaterally. No wheezes, rales, or rhonchi.  CV:  Regular rate and rhythm. No murmurs    Abdomen: Abdomen is soft, mild epigastric abdominal pain, non-distended. BS +   Extremities:  No lower extremity edema or cyanosis.   Neurological:  AAOx3. No focal deficits.  Skin:  Warm and dry.    Outpatient Follow-Up  No future appointments.    Eduardo Mathis DO   PGY-1, Internal Medicine  This is a preliminary note, please await attending attestation for final A/P

## 2025-04-29 ENCOUNTER — TELEPHONE (OUTPATIENT)
Dept: GASTROENTEROLOGY | Facility: CLINIC | Age: 65
End: 2025-04-29
Payer: COMMERCIAL

## 2025-04-29 LAB
LABORATORY REPORT: NORMAL
PETH INTERPRETATION: NORMAL
PLPETH BLD-MCNC: 73 NG/ML
POPETH BLD-MCNC: 86 NG/ML

## 2025-04-29 NOTE — TELEPHONE ENCOUNTER
Courtney Card Shalana Trinette Sandra Bickford [birth year] [phone] OV in 4  weeks to follow up on pancreatitis of uncertain (presumably ETOH) etiology

## 2025-04-30 LAB
ATRIAL RATE: 81 BPM
LABORATORY REPORT: NORMAL
P AXIS: 61 DEGREES
PETH INTERPRETATION: NORMAL
PLPETH BLD-MCNC: 66 NG/ML
POPETH BLD-MCNC: 79 NG/ML
PR INTERVAL: 176 MS
Q ONSET: 249 MS
QRS COUNT: 13 BEATS
QRS DURATION: 92 MS
QT INTERVAL: 415 MS
QTC CALCULATION(BAZETT): 482 MS
QTC FREDERICIA: 458 MS
R AXIS: -2 DEGREES
T AXIS: 29 DEGREES
T OFFSET: 457 MS
VENTRICULAR RATE: 81 BPM

## 2025-06-05 ENCOUNTER — APPOINTMENT (OUTPATIENT)
Dept: GASTROENTEROLOGY | Facility: CLINIC | Age: 65
End: 2025-06-05
Payer: COMMERCIAL

## 2025-06-06 ENCOUNTER — APPOINTMENT (OUTPATIENT)
Dept: GASTROENTEROLOGY | Facility: CLINIC | Age: 65
End: 2025-06-06
Payer: COMMERCIAL

## 2025-06-06 VITALS
HEART RATE: 77 BPM | SYSTOLIC BLOOD PRESSURE: 106 MMHG | HEIGHT: 63 IN | BODY MASS INDEX: 43.41 KG/M2 | WEIGHT: 245 LBS | DIASTOLIC BLOOD PRESSURE: 69 MMHG

## 2025-06-06 DIAGNOSIS — K85.90 ACUTE PANCREATITIS, UNSPECIFIED COMPLICATION STATUS, UNSPECIFIED PANCREATITIS TYPE (HHS-HCC): Primary | ICD-10-CM

## 2025-06-06 PROCEDURE — 99213 OFFICE O/P EST LOW 20 MIN: CPT | Performed by: NURSE PRACTITIONER

## 2025-06-06 PROCEDURE — 1036F TOBACCO NON-USER: CPT | Performed by: NURSE PRACTITIONER

## 2025-06-06 PROCEDURE — 3008F BODY MASS INDEX DOCD: CPT | Performed by: NURSE PRACTITIONER

## 2025-06-06 NOTE — ASSESSMENT & PLAN NOTE
Patient reports completely feeling better.  Tolerating regular diet well.  Denies any GI symptoms.  Will recheck lipase level.  Advised to avoid alcohol,  NSAIDs  Take low-fat diet.    No indication ot CT?MRI at this time.  Will consider repeat CT scan/ MRI in with any GI symptoms.     Follow up in 3-4 months as need.     Orders:    Lipase; Future

## 2025-06-06 NOTE — PROGRESS NOTES
CC: Hospital follow-up after acute pancreatitis    History of Present Illness:   Yisel Squires is a 65 y.o. female with a PMH of  hypertension, hypothyroidism, bipolar, alcohol use and more.     Hospital visit 4/26/2025  for  abdominal pain. GI was consulted and believe acute pancreatitis likely from alcohol use vs GOLO dietary supplement. Recommended alcohol cessation and that patient discontinue dietary supplement. Patient was continued on IVF and diet was advanced as tolerated. Patient endorsed improvement of epigastric abdominal pain.  Was discharged home.    Today for follow-up after episode of acute pancreatitis.  She reports feeling completely better.  Tolerating regular diet well.  She discontinued Golo dietary supplement.  She denies any previous episode of pancreatitis, denies any previous history of pancreatic or gallbladder issues.   Denies abdominal pain, nausea, vomiting, heartburn, acid reflux, Dysphagia.   Denies constipation, diarrhea, fever.  BM  once day  soft //brown. Denies mucus in BM.   Denies hematochezia, melena.   Denies weight loss or appetite change.    Denies use of NSAIDs, aspirin or other blood thinners.  Denies any recent change in medication or use of new medication.    Denies family history of colon cancer, celiac disease, inflammatory bowel disease, liver disease, pancreatic disease.  Reports alcohol use -3 days of weekends-2 drinks/day.  denies smoking, denies illicit drug use.    H/o Abdominal surgery    Denies previous EGD/colon , does cologuard.    Review of Systems  ROS Negative unless otherwise stated above.      Past Medical/Surgical History  Medical History[1]   Surgical History[2]     Social History   reports that she has never smoked. She has never used smokeless tobacco. She reports current alcohol use. She reports that she does not use drugs.     Family History  family history is not on file.     Allergies  RX Allergies[3]    Medications  Current Outpatient  Medications   Medication Instructions    allopurinol (ZYLOPRIM) 100 mg, oral, 2 times daily    calcium carbonate-vitamin D3 500 mg-5 mcg (200 unit) tablet 1 tablet, Daily    Caplyta 42 mg    esomeprazole (NexIUM) 20 mg DR capsule 1 capsule, Daily RT    Ingrezza 80 mg capsule     levothyroxine (SYNTHROID, LEVOXYL) 75 mcg, Daily    lisinopril 10 mg, Daily    multivitamin tablet 1 tablet, Daily    oxyCODONE-acetaminophen (Percocet) 5-325 mg tablet 1 tablet, oral, 4 times daily PRN    pravastatin (Pravachol) 20 mg tablet 1 tablet, Daily RT    xanomeline-trospium (Cobenfy) 50-20 mg capsule 1 tablet, 2 times daily        Objective   Visit Vitals  /69 (BP Location: Left arm, Patient Position: Sitting)   Pulse 77      Physical Exam   General: A&Ox3, NAD.  HEENT: No scleral icterus, no conjunctival pallor, normocephalic, atraumatic  Neck:  atraumatic, trachea midline, no JVD   CV: RRR. Positive S1/S2.   Resp: CTA bilaterally. No wheezing, rhonchi or rales.   GI: BSx4. no distension, Soft, non-tender to palpation, no rebound tenderness, no guarding, no rigidity, no discernible ascites   Extremities: no lower extremity edema.  Neuro: No focal deficits. no asterixis  Psych: pleasant and cooperative.  Skin:  Warm and dry, no jaundice      Lab Results   Component Value Date    WBC 8.4 04/28/2025    HGB 12.3 04/28/2025    HCT 39.0 04/28/2025    MCV 95 04/28/2025     04/28/2025       Chemistry    Lab Results   Component Value Date/Time     04/28/2025 0631    K 3.9 04/28/2025 0631     04/28/2025 0631    CO2 28 04/28/2025 0631    BUN 8 04/28/2025 0631    CREATININE 0.69 04/28/2025 0631    Lab Results   Component Value Date/Time    CALCIUM 9.4 04/28/2025 0631    ALKPHOS 73 04/28/2025 0631    AST 14 04/28/2025 0631    ALT 12 04/28/2025 0631    BILITOT 0.7 04/28/2025 0631          LIPASE:   Lab Results   Component Value Date    LIPASE 1,822 (H) 04/26/2025 4/27/2025 triglyceride within normal  limit.    4/27/25  IgG 641, IgG 2 -129     CT ABDOMEN PELVIS W IV CONTRAST; 4/26/2025   IMPRESSION:  Inflammatory stranding in the upper and mid abdomen surrounding  portions of the pancreas as well as multiple loops of small bowel.  Differential includes enteritis or pancreatitis. Correlate with  clinical symptoms and lipase values      Gastric wall thickening versus underdistention. Correlate for  symptoms of gastritis.      Scattered colonic diverticulosis without CT evidence of acute  diverticulitis.  MRCP PANCREAS W AND WO IV CONTRAST; 4/26/2025   IMPRESSION:  1.  Mild diffuse acute interstitial edematous pancreatitis without  fluid collection or necrosis. No main ductal dilatation or pancreas  divisum.  2. Unremarkable hepatobiliary system. No biliary tract stone or  obstruction.      Assessment & Plan  Acute pancreatitis, unspecified complication status, unspecified pancreatitis type ([facility])    Patient reports completely feeling better.  Tolerating regular diet well.  Denies any GI symptoms.  Will recheck lipase level.  Advised to avoid alcohol,  NSAIDs  Take low-fat diet.    No indication ot CT?MRI at this time.  Will consider repeat CT scan/ MRI in with any GI symptoms.     Follow up in 3-4 months as need.     Orders:    Lipase; Future             Susi Santana, APRN-CNP    The note was created using voice recognition transcription software. Despite proofreading, unintentional typographical errors may be present. Please contact the GI office with any questions or concerns.          [1] History reviewed. No pertinent past medical history.  [2] History reviewed. No pertinent surgical history.  [3] No Known Allergies

## 2025-08-08 ENCOUNTER — HOSPITAL ENCOUNTER (EMERGENCY)
Facility: HOSPITAL | Age: 65
Discharge: HOME | End: 2025-08-09
Payer: COMMERCIAL

## 2025-08-08 VITALS
HEIGHT: 63 IN | BODY MASS INDEX: 41.82 KG/M2 | WEIGHT: 236 LBS | SYSTOLIC BLOOD PRESSURE: 129 MMHG | HEART RATE: 84 BPM | DIASTOLIC BLOOD PRESSURE: 60 MMHG | TEMPERATURE: 97.7 F | OXYGEN SATURATION: 95 % | RESPIRATION RATE: 18 BRPM

## 2025-08-08 DIAGNOSIS — Z00.8 ENCOUNTER FOR PSYCHOLOGICAL EVALUATION: Primary | ICD-10-CM

## 2025-08-08 PROCEDURE — 99284 EMERGENCY DEPT VISIT MOD MDM: CPT

## 2025-08-08 ASSESSMENT — PAIN SCALES - GENERAL: PAINLEVEL_OUTOF10: 0 - NO PAIN

## 2025-08-08 ASSESSMENT — PAIN - FUNCTIONAL ASSESSMENT: PAIN_FUNCTIONAL_ASSESSMENT: 0-10

## 2025-08-08 NOTE — ED TRIAGE NOTES
64 Y/O FEMALE STATES SHE IS HEARING A MAN'S VOICE THAT IS TELLING HER THAT HE IS COMING TO TAKE HER AWAY. DENIES VOICE IS TELLING HER TO HURT HERSELF OR ANYONE ELSE. STATES THIS HAS BEEN ONGOING FOR AT LEAST 1 MONTH. PATIENT WITH HISTORY OF SCHIZOPHRENIA. STATES SHE HAS BEEN TAKING HER MEDICATIONS  PRESCRIBED AND HAS APPOINT WITH PSYCHIATRY NEXT TUESDAY.

## 2025-08-09 SDOH — HEALTH STABILITY: MENTAL HEALTH: SUICIDAL BEHAVIOR (LIFETIME): NO

## 2025-08-09 SDOH — HEALTH STABILITY: MENTAL HEALTH: IN THE PAST FEW WEEKS, HAVE YOU FELT THAT YOU OR YOUR FAMILY WOULD BE BETTER OFF IF YOU WERE DEAD?: NO

## 2025-08-09 SDOH — HEALTH STABILITY: MENTAL HEALTH: IN THE PAST WEEK, HAVE YOU BEEN HAVING THOUGHTS ABOUT KILLING YOURSELF?: NO

## 2025-08-09 SDOH — HEALTH STABILITY: MENTAL HEALTH: ANXIETY SYMPTOMS: GENERALIZED

## 2025-08-09 SDOH — HEALTH STABILITY: MENTAL HEALTH: NON-SPECIFIC ACTIVE SUICIDAL THOUGHTS (PAST 1 MONTH): NO

## 2025-08-09 SDOH — HEALTH STABILITY: MENTAL HEALTH: WISH TO BE DEAD (PAST 1 MONTH): NO

## 2025-08-09 SDOH — HEALTH STABILITY: MENTAL HEALTH: DEPRESSION SYMPTOMS: FEELINGS OF HOPELESSESS

## 2025-08-09 SDOH — HEALTH STABILITY: MENTAL HEALTH: HAVE YOU EVER TRIED TO KILL YOURSELF?: NO

## 2025-08-09 SDOH — HEALTH STABILITY: MENTAL HEALTH: ARE YOU HAVING THOUGHTS OF KILLING YOURSELF RIGHT NOW?: NO

## 2025-08-09 SDOH — ECONOMIC STABILITY: HOUSING INSECURITY: FEELS SAFE LIVING IN HOME: YES

## 2025-08-09 SDOH — ECONOMIC STABILITY: GENERAL

## 2025-08-09 SDOH — HEALTH STABILITY: MENTAL HEALTH: IN THE PAST FEW WEEKS, HAVE YOU WISHED YOU WERE DEAD?: NO

## 2025-08-09 ASSESSMENT — LIFESTYLE VARIABLES
SUBSTANCE_ABUSE_PAST_12_MONTHS: NO
PRESCIPTION_ABUSE_PAST_12_MONTHS: NO

## 2025-08-09 NOTE — DISCHARGE INSTRUCTIONS
You were seen for evaluation of increased delusions with concern for worsening of your delusions.  We talked you talk to our emergency psychiatric team and at this time we recommend that you continue with your medications as prescribed and follow-up closely with your mental health provider.  If you develop any suicidal or homicidal ideations are having trouble feeling any additional help then please go back to the ED for further evaluation.

## 2025-08-09 NOTE — ED PROVIDER NOTES
"  History of Present Illness     History provided by: Patient  Limitations to History: None Identified  External Records Reviewed with Brief Summary: Previous ED visits/recent PCP notes for PMH     HPI:  Yisel Squires is a 65 y.o. female ***    Physical Exam   Triage vitals:  T 36.5 °C (97.7 °F)  HR 84  /60  RR 18  O2 95 % None (Room air)    General: Awake, alert, in no acute distress  Eyes: Gaze conjugate.  No scleral icterus or injection  HENT: Normo-cephalic, atraumatic. No stridor  CV: RRR. Radial/PT pulses 2+ bilaterally  Resp: Breathing non-labored, speaking in full sentences.  Clear to auscultation bilaterally  GI: Soft, non-distended, non-tender. No rebound or guarding.  : Deferred  MSK/Extremities: No gross bony deformities. Moving all extremities  Skin: Warm. Appropriate color  Neuro: Alert. Oriented. Face symmetric. Speech is fluent.  Gross strength and sensation intact in b/l UE and LEs  Psych: Appropriate mood and affect      Medical Decision Making & ED Course   Medical Decision Making:  MDM:    Yisel Squires is a 65 y.o. female with a past medical history of *** who presents for psychiatric evaluation. Pt was endorsing ***. Low concern for medical etiology including infection, metabolic, dementia, delirium, or drug induced psychosis.       *** Patient was discussed with EPAT who recommended outpatient management. Patient was offered resources for outpatient psychiatric management and counseling. Patient was discharged home. Patient was given strict return precautions to return if worsening suicidal ideation, homicidal ideation, auditory/visual hallucination, paranoia or psychosis.    ----     Social Determinants of Health which Significantly Impact Care: {Social Determinants of Health which Significantly Impact Care:78912::\"None identified\"} {The following actions were taken to address these social determinants:20235}    EKG Independent Interpretation: EKG interpreted by myself. Please " see ED Course and MDM for full interpretation.    Independent Result Review and Interpretation: Results were independently reviewed and interpreted by myself. Please see ED course and MDM for full interpretation.    Chronic conditions affecting the patient's care: As documented in the MDM    Care Considerations: As per MDM    ED Course:     Disposition   {ED Disposition:22464}    Procedures   Procedures    Angy Spears DO  Emergency Medicine

## 2025-08-11 ENCOUNTER — APPOINTMENT (OUTPATIENT)
Dept: PRIMARY CARE | Facility: CLINIC | Age: 65
End: 2025-08-11
Payer: COMMERCIAL